# Patient Record
Sex: FEMALE | Race: WHITE | NOT HISPANIC OR LATINO | Employment: OTHER | URBAN - NONMETROPOLITAN AREA
[De-identification: names, ages, dates, MRNs, and addresses within clinical notes are randomized per-mention and may not be internally consistent; named-entity substitution may affect disease eponyms.]

---

## 2017-03-15 DIAGNOSIS — Z12.31 VISIT FOR SCREENING MAMMOGRAM: Primary | ICD-10-CM

## 2017-03-24 ENCOUNTER — ANESTHESIA EVENT (OUTPATIENT)
Dept: SURGERY | Facility: HOSPITAL | Age: 67
End: 2017-03-24
Payer: COMMERCIAL

## 2017-03-24 ENCOUNTER — ANESTHESIA (OUTPATIENT)
Dept: SURGERY | Facility: HOSPITAL | Age: 67
End: 2017-03-24
Payer: COMMERCIAL

## 2017-03-24 PROCEDURE — 45385 COLONOSCOPY W/LESION REMOVAL: CPT | Performed by: ANESTHESIOLOGY

## 2017-03-24 PROCEDURE — 25000125 ZZHC RX 250: Performed by: NURSE ANESTHETIST, CERTIFIED REGISTERED

## 2017-03-24 PROCEDURE — 25800025 ZZH RX 258: Performed by: ANESTHESIOLOGY

## 2017-03-24 PROCEDURE — 45385 COLONOSCOPY W/LESION REMOVAL: CPT | Performed by: NURSE ANESTHETIST, CERTIFIED REGISTERED

## 2017-03-24 RX ORDER — LIDOCAINE HYDROCHLORIDE 20 MG/ML
INJECTION, SOLUTION INFILTRATION; PERINEURAL PRN
Status: DISCONTINUED | OUTPATIENT
Start: 2017-03-24 | End: 2017-03-24

## 2017-03-24 RX ORDER — PROPOFOL 10 MG/ML
INJECTION, EMULSION INTRAVENOUS PRN
Status: DISCONTINUED | OUTPATIENT
Start: 2017-03-24 | End: 2017-03-24

## 2017-03-24 RX ADMIN — PROPOFOL 20 MG: 10 INJECTION, EMULSION INTRAVENOUS at 11:58

## 2017-03-24 RX ADMIN — SODIUM CHLORIDE, POTASSIUM CHLORIDE, SODIUM LACTATE AND CALCIUM CHLORIDE: 600; 310; 30; 20 INJECTION, SOLUTION INTRAVENOUS at 12:22

## 2017-03-24 RX ADMIN — PROPOFOL 20 MG: 10 INJECTION, EMULSION INTRAVENOUS at 12:08

## 2017-03-24 RX ADMIN — PROPOFOL 20 MG: 10 INJECTION, EMULSION INTRAVENOUS at 11:55

## 2017-03-24 RX ADMIN — PROPOFOL 20 MG: 10 INJECTION, EMULSION INTRAVENOUS at 11:46

## 2017-03-24 RX ADMIN — PROPOFOL 10 MG: 10 INJECTION, EMULSION INTRAVENOUS at 12:20

## 2017-03-24 RX ADMIN — PROPOFOL 20 MG: 10 INJECTION, EMULSION INTRAVENOUS at 11:44

## 2017-03-24 RX ADMIN — PROPOFOL 30 MG: 10 INJECTION, EMULSION INTRAVENOUS at 11:51

## 2017-03-24 RX ADMIN — PROPOFOL 10 MG: 10 INJECTION, EMULSION INTRAVENOUS at 12:17

## 2017-03-24 RX ADMIN — PROPOFOL 20 MG: 10 INJECTION, EMULSION INTRAVENOUS at 11:56

## 2017-03-24 RX ADMIN — PROPOFOL 20 MG: 10 INJECTION, EMULSION INTRAVENOUS at 11:43

## 2017-03-24 RX ADMIN — PROPOFOL 20 MG: 10 INJECTION, EMULSION INTRAVENOUS at 11:52

## 2017-03-24 RX ADMIN — PROPOFOL 20 MG: 10 INJECTION, EMULSION INTRAVENOUS at 11:49

## 2017-03-24 RX ADMIN — PROPOFOL 20 MG: 10 INJECTION, EMULSION INTRAVENOUS at 11:48

## 2017-03-24 RX ADMIN — PROPOFOL 20 MG: 10 INJECTION, EMULSION INTRAVENOUS at 11:45

## 2017-03-24 RX ADMIN — PROPOFOL 20 MG: 10 INJECTION, EMULSION INTRAVENOUS at 12:02

## 2017-03-24 RX ADMIN — PROPOFOL 20 MG: 10 INJECTION, EMULSION INTRAVENOUS at 11:50

## 2017-03-24 RX ADMIN — LIDOCAINE HYDROCHLORIDE 40 MG: 20 INJECTION, SOLUTION INFILTRATION; PERINEURAL at 11:43

## 2017-03-24 RX ADMIN — PROPOFOL 20 MG: 10 INJECTION, EMULSION INTRAVENOUS at 12:10

## 2017-03-24 RX ADMIN — PROPOFOL 20 MG: 10 INJECTION, EMULSION INTRAVENOUS at 12:06

## 2017-03-24 RX ADMIN — PROPOFOL 20 MG: 10 INJECTION, EMULSION INTRAVENOUS at 12:04

## 2017-03-24 RX ADMIN — PROPOFOL 20 MG: 10 INJECTION, EMULSION INTRAVENOUS at 12:12

## 2017-03-24 RX ADMIN — PROPOFOL 20 MG: 10 INJECTION, EMULSION INTRAVENOUS at 11:54

## 2017-03-24 RX ADMIN — PROPOFOL 20 MG: 10 INJECTION, EMULSION INTRAVENOUS at 11:53

## 2017-03-24 RX ADMIN — PROPOFOL 20 MG: 10 INJECTION, EMULSION INTRAVENOUS at 12:00

## 2017-03-24 RX ADMIN — PROPOFOL 30 MG: 10 INJECTION, EMULSION INTRAVENOUS at 11:47

## 2017-03-24 ASSESSMENT — LIFESTYLE VARIABLES: TOBACCO_USE: 1

## 2017-03-24 NOTE — ANESTHESIA POSTPROCEDURE EVALUATION
Patient: Alejo Fields    Procedure(s):  COLONOSCOPY WITH POLYPECTOMY, ENDOCLIP, TATOO - Wound Class: II-Clean Contaminated    Diagnosis:HX OF COLON POLYPS  Diagnosis Additional Information: No value filed.    Anesthesia Type:  MAC    Note:  Anesthesia Post Evaluation    Patient location during evaluation: Phase 2 and Bedside  Patient participation: Able to fully participate in evaluation  Level of consciousness: awake and alert  Pain management: adequate  Airway patency: patent  Cardiovascular status: acceptable  Respiratory status: acceptable  Hydration status: stable  PONV: none     Anesthetic complications: None          Last vitals:  Vitals:    03/24/17 1240 03/24/17 1245 03/24/17 1251   BP: 158/97 157/93    Pulse:      Resp:      Temp:      SpO2: 94% 94% 92%         Electronically Signed By: Rickey Vaca MD  March 24, 2017  1:38 PM

## 2017-03-24 NOTE — ANESTHESIA PREPROCEDURE EVALUATION
Anesthesia Evaluation     . Pt has had prior anesthetic.     No history of anesthetic complications          ROS/MED HX    ENT/Pulmonary:     (+)tobacco use, Past use , . .    Neurologic:  - neg neurologic ROS     Cardiovascular:  - neg cardiovascular ROS   (+) ----. : . . . :. . Previous cardiac testing date:results:date: results:ECG reviewed date:9/2/2016 results:NSR@85, OWN date: results:          METS/Exercise Tolerance:     Hematologic:  - neg hematologic  ROS       Musculoskeletal:   (+) arthritis, , , other musculoskeletal- Rheumatoid, Eczema      GI/Hepatic:     (+) bowel prep,       Renal/Genitourinary:  - ROS Renal section negative       Endo:  - neg endo ROS       Psychiatric:  - neg psychiatric ROS       Infectious Disease:  - neg infectious disease ROS       Malignancy:      - no malignancy   Other:    - neg other ROS                 Physical Exam  Normal systems: cardiovascular and pulmonary    Airway   Mallampati: IV  TM distance: <3 FB  Neck ROM: full    Dental   (+) chipped    Cardiovascular   Rhythm and rate: regular and normal      Pulmonary    breath sounds clear to auscultation                    Anesthesia Plan      History & Physical Review  History and physical reviewed and following examination; no interval change.    ASA Status:  2 .    NPO Status:  > 8 hours    Plan for MAC with Intravenous and Propofol induction. Maintenance will be TIVA.    PONV prophylaxis:  Ondansetron (or other 5HT-3)  Surgeon requests deep sedation. Patient has a Mallampati 4 airway. Will provide MAC.      Postoperative Care  Postoperative pain management:  Multi-modal analgesia.      Consents  Anesthetic plan, risks, benefits and alternatives discussed with:  Patient..                          .

## 2017-03-24 NOTE — ANESTHESIA CARE TRANSFER NOTE
Patient: Alejo Fields    Procedure(s):  COLONOSCOPY WITH POLYPECTOMY, ENDOCLIP, TATOO - Wound Class: II-Clean Contaminated    Diagnosis: HX OF COLON POLYPS  Diagnosis Additional Information: No value filed.    Anesthesia Type:   MAC     Note:  Airway :Nasal Cannula  Patient transferred to:Phase II        Vitals: (Last set prior to Anesthesia Care Transfer)    CRNA VITALS  3/24/2017 1154 - 3/24/2017 1225      3/24/2017             Resp Rate (set): 8                Electronically Signed By: VERNA Engle CRNA  March 24, 2017  12:25 PM

## 2017-09-11 ENCOUNTER — TRANSFERRED RECORDS (OUTPATIENT)
Dept: HEALTH INFORMATION MANAGEMENT | Facility: CLINIC | Age: 67
End: 2017-09-11

## 2018-05-30 ENCOUNTER — OFFICE VISIT (OUTPATIENT)
Dept: FAMILY MEDICINE | Facility: OTHER | Age: 68
End: 2018-05-30
Attending: PHYSICIAN ASSISTANT
Payer: COMMERCIAL

## 2018-05-30 VITALS
OXYGEN SATURATION: 94 % | HEIGHT: 64 IN | HEART RATE: 106 BPM | WEIGHT: 225 LBS | TEMPERATURE: 98.9 F | BODY MASS INDEX: 38.41 KG/M2 | SYSTOLIC BLOOD PRESSURE: 130 MMHG | DIASTOLIC BLOOD PRESSURE: 70 MMHG | RESPIRATION RATE: 18 BRPM

## 2018-05-30 DIAGNOSIS — R60.0 LOCALIZED EDEMA: ICD-10-CM

## 2018-05-30 DIAGNOSIS — Z83.49 FH: THYROID DISEASE: ICD-10-CM

## 2018-05-30 DIAGNOSIS — Z82.49 FH: HEART DISEASE: ICD-10-CM

## 2018-05-30 DIAGNOSIS — Z12.31 ENCOUNTER FOR SCREENING MAMMOGRAM FOR BREAST CANCER: ICD-10-CM

## 2018-05-30 DIAGNOSIS — Z78.0 MENOPAUSE: ICD-10-CM

## 2018-05-30 DIAGNOSIS — Z01.419 WELL WOMAN EXAM: Primary | ICD-10-CM

## 2018-05-30 PROCEDURE — G0463 HOSPITAL OUTPT CLINIC VISIT: HCPCS

## 2018-05-30 PROCEDURE — 99397 PER PM REEVAL EST PAT 65+ YR: CPT | Performed by: PHYSICIAN ASSISTANT

## 2018-05-30 RX ORDER — FUROSEMIDE 20 MG
TABLET ORAL
Qty: 90 TABLET | Refills: 1 | Status: SHIPPED | OUTPATIENT
Start: 2018-05-30 | End: 2022-08-24

## 2018-05-30 ASSESSMENT — ANXIETY QUESTIONNAIRES
7. FEELING AFRAID AS IF SOMETHING AWFUL MIGHT HAPPEN: NOT AT ALL
6. BECOMING EASILY ANNOYED OR IRRITABLE: NOT AT ALL
3. WORRYING TOO MUCH ABOUT DIFFERENT THINGS: NOT AT ALL
5. BEING SO RESTLESS THAT IT IS HARD TO SIT STILL: NOT AT ALL
1. FEELING NERVOUS, ANXIOUS, OR ON EDGE: NOT AT ALL
2. NOT BEING ABLE TO STOP OR CONTROL WORRYING: NOT AT ALL
IF YOU CHECKED OFF ANY PROBLEMS ON THIS QUESTIONNAIRE, HOW DIFFICULT HAVE THESE PROBLEMS MADE IT FOR YOU TO DO YOUR WORK, TAKE CARE OF THINGS AT HOME, OR GET ALONG WITH OTHER PEOPLE: NOT DIFFICULT AT ALL
GAD7 TOTAL SCORE: 0

## 2018-05-30 ASSESSMENT — PAIN SCALES - GENERAL: PAINLEVEL: NO PAIN (0)

## 2018-05-30 ASSESSMENT — PATIENT HEALTH QUESTIONNAIRE - PHQ9: 5. POOR APPETITE OR OVEREATING: NOT AT ALL

## 2018-05-30 NOTE — MR AVS SNAPSHOT
After Visit Summary   5/30/2018    Alejo Fields    MRN: [id number]           Patient Information     Date Of Birth          1950        Visit Information        Provider Department      5/30/2018 2:30 PM Ana María Sanderson PA [facility]        Today's Diagnoses     Well woman exam    -  1    FH: heart disease        FH: thyroid disease        Encounter for screening mammogram for breast cancer        Menopause        Localized edema          Care Instructions      Preventive Health Recommendations    Female Ages 65 +    Yearly exam:     See your health care provider every year in order to  o Review health changes.   o Discuss preventive care.    o Review your medicines if your doctor has prescribed any.      You no longer need a yearly Pap test unless you've had an abnormal Pap test in the past 10 years. If you have vaginal symptoms, such as bleeding or discharge, be sure to talk with your provider about a Pap test.      Every 1 to 2 years, have a mammogram.  If you are over 69, talk with your health care provider about whether or not you want to continue having screening mammograms.      Every 10 years, have a colonoscopy. Or, have a yearly FIT test (stool test). These exams will check for colon cancer.       Have a cholesterol test every 5 years, or more often if your doctor advises it.       Have a diabetes test (fasting glucose) every three years. If you are at risk for diabetes, you should have this test more often.       At age 65, have a bone density scan (DEXA) to check for osteoporosis (brittle bone disease).    Shots:    Get a flu shot each year.    Get a tetanus shot every 10 years.    Talk to your doctor about your pneumonia vaccines. There are now two you should receive - Pneumovax (PPSV 23) and Prevnar (PCV 13).    Talk to your doctor about the shingles vaccine.    Talk to your doctor about the hepatitis B vaccine.    Nutrition:     Eat at least 5 servings of  fruits and vegetables each day.      Eat whole-grain bread, whole-wheat pasta and brown rice instead of white grains and rice.      Talk to your provider about Calcium and Vitamin D.     Lifestyle    Exercise at least 150 minutes a week (30 minutes a day, 5 days a week). This will help you control your weight and prevent disease.      Limit alcohol to one drink per day.      No smoking.       Wear sunscreen to prevent skin cancer.       See your dentist twice a year for an exam and cleaning.      See your eye doctor every 1 to 2 years to screen for conditions such as glaucoma, macular degeneration and cataracts.          Follow-ups after your visit        Future tests that were ordered for you today     Open Future Orders        Priority Expected Expires Ordered    MA Screen Bilateral w/Ren Routine  5/30/2019 5/30/2018    DX Hip/Pelvis/Spine Routine  5/30/2019 5/30/2018    CBC with platelets Routine  6/30/2018 5/30/2018    Lipid Profile Routine  6/30/2018 5/30/2018    Comprehensive metabolic panel Routine  6/30/2018 5/30/2018    TSH with free T4 reflex Routine  6/30/2018 5/30/2018            Who to contact     If you have questions or need follow up information about today's clinic visit or your schedule please contact [facility] directly at [phone].  Normal or non-critical lab and imaging results will be communicated to you by Insurance Noodlehart, letter or phone within 4 business days after the clinic has received the results. If you do not hear from us within 7 days, please contact the clinic through Imnisht or phone. If you have a critical or abnormal lab result, we will notify you by phone as soon as possible.  Submit refill requests through [employer] D[employer] or call your pharmacy and they will forward the refill request to us. Please allow 3 business days for your refill to be completed.          Additional Information About Your Visit        Fresh Dish Information     Fresh D[employer] gives you secure access to your  "electronic health record. If you see a primary care provider, you can also send messages to your care team and make appointments. If you have questions, please call your primary care clinic.  If you do not have a primary care provider, please call [phone] and they will assist you.        Care EveryWhere ID     This is your Care EveryWhere ID. This could be used by other organizations to access your [facility] medical records  [id number]        Your Vitals Were     Pulse Temperature Respirations Height Pulse Oximetry BMI (Body Mass Index)    106 98.9  F (37.2  C) (Tympanic) 18 5' 3.75\" (1.619 m) 94% 38.92 kg/m2       Blood Pressure from Last 3 Encounters:   05/30/18 130/70   03/24/17 157/93   10/05/16 138/82    Weight from Last 3 Encounters:   05/30/18 225 lb (102.1 kg)   03/24/17 233 lb (105.7 kg)   10/05/16 220 lb (99.8 kg)                 Today's Medication Changes          These changes are accurate as of 5/30/18  3:38 PM.  If you have any questions, ask your nurse or doctor.               Start taking these medicines.        Dose/Directions    furosemide 20 MG tablet   Commonly known as:  LASIX   Used for:  Localized edema   Started by:  Ana María Sanderson PA        1 tab daily prn for ankle swelling   Quantity:  90 tablet   Refills:  1         Stop taking these medicines if you haven't already. Please contact your care team if you have questions.     fluocinonide 0.05 % solution   Commonly known as:  LIDEX   Stopped by:  Ana María Sanderson PA           ketoconazole 2 % shampoo   Commonly known as:  NIZORAL   Stopped by:  Ana María Sanderson PA                Where to get your medicines      These medications were sent to [facility] [address] [address]     Phone:  [phone]     furosemide 20 MG tablet                Primary Care Provider Office Phone # Fax #    JORDANA Pastor [phone]       [facility] " [place] [address]  [place] [address]        Equal Access to Services     ROSANGELATRUDI SHANNAN : Hadii maría ku hadhalio Solisbethali, waaxda luqadaha, qaybta kaalmada jamesjesisca, argelia kidd giovannikris manjarrezbharatcarlos arellano. So [facility] [phone].    ATENCIÓN: Si habla español, tiene a simons disposición servicios gratuitos de asistencia lingüística. Llame al [phone].    We comply with applicable federal civil rights laws and [place] laws. We do not discriminate on the basis of race, color, national origin, age, disability, sex, sexual orientation, or gender identity.            Thank you!     Thank you for choosing [facility]  for your care. Our goal is always to provide you with excellent care. Hearing back from our patients is one way we can continue to improve our services. Please take a few minutes to complete the written survey that you may receive in the mail after your visit with us. Thank you!             Your Updated Medication List - Protect others around you: Learn how to safely use, store and throw away your medicines at www.disposemymeds.org.          This list is accurate as of 5/30/18  3:38 PM.  Always use your most recent med list.                   Brand Name Dispense Instructions for use Diagnosis    FOLIC ACID PO      Take 1 mg by mouth 2 times daily        furosemide 20 MG tablet    LASIX    90 tablet    1 tab daily prn for ankle swelling    Localized edema       methotrexate 2.5 MG tablet CHEMO     32 tablet    8 tabs once weekly    Rheumatoid arthritis involving both hands, unspecified rheumatoid factor presence (H)       sulfaSALAzine 500 MG tablet    AZULFIDINE     Take 1,000 mg by mouth 2 times daily

## 2018-05-30 NOTE — PROGRESS NOTES
SUBJECTIVE:   Alejo Fields is a 67 year old female who presents for Preventive Visit. Concern is ankle swelling for 2 years. Worse in the summer.        Are you in the first 12 months of your Medicare Part B coverage?  No    Healthy Habits:    Do you get at least three servings of calcium containing foods daily (dairy, green leafy vegetables, etc.)? yes    Amount of exercise or daily activities, outside of work: none    Problems taking medications regularly No    Medication side effects: No    Have you had an eye exam in the past two years? yes    Do you see a dentist twice per year? no    Do you have sleep apnea, excessive snoring or daytime drowsiness?gets tired after work      Ability to successfully perform activities of daily living: Yes, no assistance needed    Home safety:  none identified     Hearing impairment: No    Fall risk:  Fallen 2 or more times in the past year?: No  Any fall with injury in the past year?: No        Mini-CogTM Copyright HAILEY Whitehead. Licensed by the author for use in [facility]; reprinted with permission ([email or web address]). All rights reserved.                Reviewed and updated as needed this visit by clinical staff  Tobacco  Allergies  Meds  Med Hx  Surg Hx  Fam Hx  Soc Hx        Reviewed and updated as needed this visit by Provider        Social History   Substance Use Topics     Smoking status: Former Smoker     Smokeless tobacco: Never Used     Alcohol use Not on file       If you drink alcohol do you typically have >3 drinks per day or >7 drinks per week?                         Today's PHQ-2 Score:   PHQ-2 ( 1999 Pfizer) 7/14/2014   Q1: Little interest or pleasure in doing things 0   Q2: Feeling down, depressed or hopeless 0   PHQ-2 Score 0       Do you feel safe in your environment - Yes    Do you have a Health Care Directive?:     Current providers sharing in care for this patient include:   Patient Care Team:  Ana María Sanderson PA as PCP - General  "(Family Practice)    The following health maintenance items are reviewed in Epic and correct as of today:  Health Maintenance   Topic Date Due     HEPATITIS C SCREENING  09/19/1969     ADVANCE DIRECTIVE PLANNING Q5 YRS  09/19/2006     FALL RISK ASSESSMENT  09/19/2016     DEXA SCAN SCREENING (SYSTEM ASSIGNED)  09/19/2016     PNEUMOCOCCAL (1 of 2 - PCV13) 09/19/2016     MAMMO SCREEN Q2 YR (SYSTEM ASSIGNED)  04/12/2018     INFLUENZA VACCINE (Season Ended) 09/01/2018     LIPID SCREEN Q5 YR FEMALE (SYSTEM ASSIGNED)  04/04/2021     TETANUS IMMUNIZATION (SYSTEM ASSIGNED)  04/04/2026     COLON CANCER SCREEN (SYSTEM ASSIGNED)  04/21/2027             ROS:  CONSTITUTIONAL: NEGATIVE for fever, chills, change in weight  INTEGUMENTARY/SKIN: NEGATIVE for worrisome rashes, moles or lesions  EYES: NEGATIVE for vision changes or irritation  ENT/MOUTH: NEGATIVE for ear, mouth and throat problems  RESP: NEGATIVE for significant cough or SOB  BREAST: NEGATIVE for masses, tenderness or discharge  CV: NEGATIVE for chest pain, palpitations or peripheral edema  GI: NEGATIVE for nausea, abdominal pain, heartburn, or change in bowel habits  : NEGATIVE for frequency, dysuria, or hematuria  MUSCULOSKELETAL: NEGATIVE for significant arthralgias or myalgia  NEURO: NEGATIVE for weakness, dizziness or paresthesias  ENDOCRINE: NEGATIVE for temperature intolerance, skin/hair changes  HEME: NEGATIVE for bleeding problems  PSYCHIATRIC: NEGATIVE for changes in mood or affect    OBJECTIVE:   /70  Pulse 106  Temp 98.9  F (37.2  C) (Tympanic)  Resp 18  Ht 5' 3.75\" (1.619 m)  Wt 225 lb (102.1 kg)  SpO2 94%  BMI 38.92 kg/m2 Estimated body mass index is 38.92 kg/(m^2) as calculated from the following:    Height as of this encounter: 5' 3.75\" (1.619 m).    Weight as of this encounter: 225 lb (102.1 kg).  EXAM:   GENERAL: healthy, alert and no distress  EYES: Eyes grossly normal to inspection, PERRL and conjunctivae and sclerae normal  HENT: ear " "canals and TM's normal, nose and mouth without ulcers or lesions  NECK: no adenopathy, no asymmetry, masses, or scars and thyroid normal to palpation  RESP: lungs clear to auscultation - no rales, rhonchi or wheezes  BREAST: normal without masses, tenderness or nipple discharge and no palpable axillary masses or adenopathy  CV: regular rate and rhythm, normal S1 S2, no S3 or S4, no murmur, click or rub, non-pitting peripheral edema and peripheral pulses strong  ABDOMEN: soft, nontender, no hepatosplenomegaly, no masses and bowel sounds normal   (female): normal female external genitalia, normal urethral meatus, vaginal mucosa pink, moist, well rugated, and normal cervix/adnexa/uterus without masses or discharge  MS: no gross musculoskeletal defects noted, no edema  SKIN: no suspicious lesions or rashes  NEURO: Normal strength and tone, mentation intact and speech normal  PSYCH: mentation appears normal, affect normal/bright    ASSESSMENT / PLAN:   (Z01.419) Well woman exam  (primary encounter diagnosis)  Comment: with below exception, normal exam today. Plan to schedule colonoscopy in the Fall. 1 month recheck of K+ after starting Lasix      (Z82.49) FH: heart disease  Comment: future lab  Plan: CBC with platelets, Lipid Profile,         Comprehensive metabolic panel            (Z83.49) FH: thyroid disease  Comment: future lab  Plan: TSH with free T4 reflex            (Z12.31) Encounter for screening mammogram for breast cancer  Plan: MA Screen Bilateral w/Ren            (Z78.0) Menopause  Plan: DX Hip/Pelvis/Spine            (R60.0) Localized edema  Comment: take prn  Plan: furosemide (LASIX) 20 MG tablet                End of Life Planning:  Patient currently has an advanced directive:     COUNSELING:          Estimated body mass index is 38.92 kg/(m^2) as calculated from the following:    Height as of this encounter: 5' 3.75\" (1.619 m).    Weight as of this encounter: 225 lb (102.1 kg).       reports that she " has quit smoking. She has never used smokeless tobacco.  Tobacco Cessation Action Plan: patient is a some day smoker    Appropriate preventive services were discussed with this patient, including applicable screening as appropriate for cardiovascular disease, diabetes, osteopenia/osteoporosis, and glaucoma.  As appropriate for age/gender, discussed screening for colorectal cancer, prostate cancer, breast cancer, and cervical cancer. Checklist reviewing preventive services available has been given to the patient.    Reviewed patients plan of care and provided an AVS. The  for Alejo meets the Care Plan requirement. This Care Plan has been established and reviewed with the .    Counseling Resources:  ATP IV Guidelines  Pooled Cohorts Equation Calculator  Breast Cancer Risk Calculator  FRAX Risk Assessment  ICSI Preventive Guidelines  Dietary Guidelines for Americans, 2010  USDA's MyPlate  ASA Prophylaxis  Lung CA Screening    JORDANA Vega  [facility]

## 2018-05-30 NOTE — NURSING NOTE
"Chief Complaint   Patient presents with     Wellness Visit     annual follow up       Initial /70  Pulse 106  Temp 98.9  F (37.2  C) (Tympanic)  Resp 18  Ht 5' 3.75\" (1.619 m)  Wt 225 lb (102.1 kg)  SpO2 94%  BMI 38.92 kg/m2 Estimated body mass index is 38.92 kg/(m^2) as calculated from the following:    Height as of this encounter: 5' 3.75\" (1.619 m).    Weight as of this encounter: 225 lb (102.1 kg).  Medication Reconciliation: complete    Laurel Zimmer LPN  "

## 2018-05-31 ASSESSMENT — ANXIETY QUESTIONNAIRES: GAD7 TOTAL SCORE: 0

## 2018-05-31 ASSESSMENT — PATIENT HEALTH QUESTIONNAIRE - PHQ9: SUM OF ALL RESPONSES TO PHQ QUESTIONS 1-9: 0

## 2018-06-08 DIAGNOSIS — Z82.49 FH: HEART DISEASE: ICD-10-CM

## 2018-06-08 DIAGNOSIS — Z83.49 FH: THYROID DISEASE: ICD-10-CM

## 2018-06-08 LAB
ALBUMIN SERPL-MCNC: 3.8 G/DL (ref 3.4–5)
ALP SERPL-CCNC: 82 U/L (ref 40–150)
ALT SERPL W P-5'-P-CCNC: 18 U/L (ref 0–50)
ANION GAP SERPL CALCULATED.3IONS-SCNC: 8 MMOL/L (ref 3–14)
AST SERPL W P-5'-P-CCNC: 13 U/L (ref 0–45)
BILIRUB SERPL-MCNC: 0.4 MG/DL (ref 0.2–1.3)
BUN SERPL-MCNC: 13 MG/DL (ref 7–30)
CALCIUM SERPL-MCNC: 8.8 MG/DL (ref 8.5–10.1)
CHLORIDE SERPL-SCNC: 107 MMOL/L (ref 94–109)
CHOLEST SERPL-MCNC: 202 MG/DL
CO2 SERPL-SCNC: 27 MMOL/L (ref 20–32)
CREAT SERPL-MCNC: 0.55 MG/DL (ref 0.52–1.04)
ERYTHROCYTE [DISTWIDTH] IN BLOOD BY AUTOMATED COUNT: 12.9 % (ref 10–15)
GFR SERPL CREATININE-BSD FRML MDRD: >90 ML/MIN/1.7M2
GLUCOSE SERPL-MCNC: 98 MG/DL (ref 70–99)
HCT VFR BLD AUTO: 38.4 % (ref 35–47)
HDLC SERPL-MCNC: 53 MG/DL
HGB BLD-MCNC: 13.2 G/DL (ref 11.7–15.7)
LDLC SERPL CALC-MCNC: 130 MG/DL
MCH RBC QN AUTO: 32 PG (ref 26.5–33)
MCHC RBC AUTO-ENTMCNC: 34.4 G/DL (ref 31.5–36.5)
MCV RBC AUTO: 93 FL (ref 78–100)
NONHDLC SERPL-MCNC: 149 MG/DL
PLATELET # BLD AUTO: 333 10E9/L (ref 150–450)
POTASSIUM SERPL-SCNC: 4.1 MMOL/L (ref 3.4–5.3)
PROT SERPL-MCNC: 7.7 G/DL (ref 6.8–8.8)
RBC # BLD AUTO: 4.12 10E12/L (ref 3.8–5.2)
SODIUM SERPL-SCNC: 142 MMOL/L (ref 133–144)
TRIGL SERPL-MCNC: 95 MG/DL
TSH SERPL DL<=0.005 MIU/L-ACNC: 2.49 MU/L (ref 0.4–4)
WBC # BLD AUTO: 8 10E9/L (ref 4–11)

## 2018-06-08 PROCEDURE — 80053 COMPREHEN METABOLIC PANEL: CPT | Mod: ZL | Performed by: PHYSICIAN ASSISTANT

## 2018-06-08 PROCEDURE — 84443 ASSAY THYROID STIM HORMONE: CPT | Mod: ZL | Performed by: PHYSICIAN ASSISTANT

## 2018-06-08 PROCEDURE — 80061 LIPID PANEL: CPT | Mod: ZL | Performed by: PHYSICIAN ASSISTANT

## 2018-06-08 PROCEDURE — 85027 COMPLETE CBC AUTOMATED: CPT | Mod: ZL | Performed by: PHYSICIAN ASSISTANT

## 2018-06-08 PROCEDURE — 36415 COLL VENOUS BLD VENIPUNCTURE: CPT | Mod: ZL | Performed by: PHYSICIAN ASSISTANT

## 2018-06-11 ENCOUNTER — HOSPITAL ENCOUNTER (OUTPATIENT)
Dept: BONE DENSITY | Facility: HOSPITAL | Age: 68
Discharge: HOME | End: 2018-06-11
Attending: PHYSICIAN ASSISTANT | Admitting: PHYSICIAN ASSISTANT
Payer: COMMERCIAL

## 2018-06-11 ENCOUNTER — RADIANT APPOINTMENT (OUTPATIENT)
Dept: MAMMOGRAPHY | Facility: OTHER | Age: 68
End: 2018-06-11
Attending: PHYSICIAN ASSISTANT
Payer: COMMERCIAL

## 2018-06-11 DIAGNOSIS — Z78.0 MENOPAUSE: ICD-10-CM

## 2018-06-11 DIAGNOSIS — Z12.31 ENCOUNTER FOR SCREENING MAMMOGRAM FOR BREAST CANCER: ICD-10-CM

## 2018-06-11 PROCEDURE — 77080 DXA BONE DENSITY AXIAL: CPT | Mod: TC

## 2018-06-11 PROCEDURE — 77067 SCR MAMMO BI INCL CAD: CPT | Mod: TC

## 2019-11-12 ENCOUNTER — ANCILLARY PROCEDURE (OUTPATIENT)
Dept: MAMMOGRAPHY | Facility: OTHER | Age: 69
End: 2019-11-12
Attending: PHYSICIAN ASSISTANT
Payer: COMMERCIAL

## 2019-11-12 DIAGNOSIS — Z12.31 VISIT FOR SCREENING MAMMOGRAM: ICD-10-CM

## 2019-11-12 PROCEDURE — 77063 BREAST TOMOSYNTHESIS BI: CPT | Mod: TC

## 2020-03-02 ENCOUNTER — HEALTH MAINTENANCE LETTER (OUTPATIENT)
Age: 70
End: 2020-03-02

## 2020-12-20 ENCOUNTER — HEALTH MAINTENANCE LETTER (OUTPATIENT)
Age: 70
End: 2020-12-20

## 2021-04-18 ENCOUNTER — HEALTH MAINTENANCE LETTER (OUTPATIENT)
Age: 71
End: 2021-04-18

## 2021-10-03 ENCOUNTER — HEALTH MAINTENANCE LETTER (OUTPATIENT)
Age: 71
End: 2021-10-03

## 2021-12-14 ENCOUNTER — OFFICE VISIT (OUTPATIENT)
Dept: FAMILY MEDICINE | Facility: OTHER | Age: 71
End: 2021-12-14
Attending: PHYSICIAN ASSISTANT
Payer: COMMERCIAL

## 2021-12-14 VITALS
TEMPERATURE: 98.8 F | HEIGHT: 63 IN | OXYGEN SATURATION: 91 % | BODY MASS INDEX: 46.56 KG/M2 | RESPIRATION RATE: 18 BRPM | SYSTOLIC BLOOD PRESSURE: 138 MMHG | WEIGHT: 262.8 LBS | DIASTOLIC BLOOD PRESSURE: 66 MMHG | HEART RATE: 96 BPM

## 2021-12-14 DIAGNOSIS — Z00.00 ROUTINE GENERAL MEDICAL EXAMINATION AT A HEALTH CARE FACILITY: Primary | ICD-10-CM

## 2021-12-14 DIAGNOSIS — Z12.31 ENCOUNTER FOR SCREENING MAMMOGRAM FOR BREAST CANCER: ICD-10-CM

## 2021-12-14 DIAGNOSIS — M05.732 RHEUMATOID ARTHRITIS INVOLVING BOTH WRISTS WITH POSITIVE RHEUMATOID FACTOR (H): ICD-10-CM

## 2021-12-14 DIAGNOSIS — Z83.3 FAMILY HISTORY OF DIABETES MELLITUS: ICD-10-CM

## 2021-12-14 DIAGNOSIS — M05.731 RHEUMATOID ARTHRITIS INVOLVING BOTH WRISTS WITH POSITIVE RHEUMATOID FACTOR (H): ICD-10-CM

## 2021-12-14 DIAGNOSIS — R60.9 EDEMA, UNSPECIFIED TYPE: ICD-10-CM

## 2021-12-14 PROBLEM — M06.9 RA (RHEUMATOID ARTHRITIS) (H): Status: ACTIVE | Noted: 2021-12-14

## 2021-12-14 LAB
ERYTHROCYTE [DISTWIDTH] IN BLOOD BY AUTOMATED COUNT: 13.9 % (ref 10–15)
HCT VFR BLD AUTO: 40.1 % (ref 35–47)
HGB BLD-MCNC: 13 G/DL (ref 11.7–15.7)
MCH RBC QN AUTO: 30.8 PG (ref 26.5–33)
MCHC RBC AUTO-ENTMCNC: 32.4 G/DL (ref 31.5–36.5)
MCV RBC AUTO: 95 FL (ref 78–100)
PLATELET # BLD AUTO: 351 10E3/UL (ref 150–450)
RBC # BLD AUTO: 4.22 10E6/UL (ref 3.8–5.2)
WBC # BLD AUTO: 9.4 10E3/UL (ref 4–11)

## 2021-12-14 PROCEDURE — 85027 COMPLETE CBC AUTOMATED: CPT | Mod: ZL | Performed by: PHYSICIAN ASSISTANT

## 2021-12-14 PROCEDURE — 84443 ASSAY THYROID STIM HORMONE: CPT | Mod: ZL | Performed by: PHYSICIAN ASSISTANT

## 2021-12-14 PROCEDURE — 80061 LIPID PANEL: CPT | Mod: ZL | Performed by: PHYSICIAN ASSISTANT

## 2021-12-14 PROCEDURE — 36415 COLL VENOUS BLD VENIPUNCTURE: CPT | Mod: ZL | Performed by: PHYSICIAN ASSISTANT

## 2021-12-14 PROCEDURE — 99397 PER PM REEVAL EST PAT 65+ YR: CPT | Performed by: PHYSICIAN ASSISTANT

## 2021-12-14 PROCEDURE — 80053 COMPREHEN METABOLIC PANEL: CPT | Mod: ZL | Performed by: PHYSICIAN ASSISTANT

## 2021-12-14 ASSESSMENT — PAIN SCALES - GENERAL: PAINLEVEL: NO PAIN (0)

## 2021-12-14 ASSESSMENT — ACTIVITIES OF DAILY LIVING (ADL): CURRENT_FUNCTION: NO ASSISTANCE NEEDED

## 2021-12-14 ASSESSMENT — MIFFLIN-ST. JEOR: SCORE: 1681.05

## 2021-12-14 NOTE — LETTER
December 15, 2021      Alejo Fields  [address]  [place] [address]        Dear ,    We are writing to inform you of your test results.    Your test results fall within the expected range(s) or remain unchanged from previous results.  Please continue with current treatment plan.    Resulted Orders   CBC with platelets   Result Value Ref Range    WBC Count 9.4 4.0 - 11.0 10e3/uL    RBC Count 4.22 3.80 - 5.20 10e6/uL    Hemoglobin 13.0 11.7 - 15.7 g/dL    Hematocrit 40.1 35.0 - 47.0 %    MCV 95 78 - 100 fL    MCH 30.8 26.5 - 33.0 pg    MCHC 32.4 31.5 - 36.5 g/dL    RDW 13.9 10.0 - 15.0 %    Platelet Count 351 150 - 450 10e3/uL   Comprehensive metabolic panel (BMP + Alb, Alk Phos, ALT, AST, Total. Bili, TP)   Result Value Ref Range    Sodium 140 133 - 144 mmol/L    Potassium 4.1 3.4 - 5.3 mmol/L    Chloride 107 94 - 109 mmol/L    Carbon Dioxide (CO2) 26 20 - 32 mmol/L    Anion Gap 7 3 - 14 mmol/L    Urea Nitrogen 19 7 - 30 mg/dL    Creatinine 0.73 0.52 - 1.04 mg/dL    Calcium 9.2 8.5 - 10.1 mg/dL    Glucose 99 70 - 99 mg/dL    Alkaline Phosphatase 85 40 - 150 U/L    AST 12 0 - 45 U/L    ALT 22 0 - 50 U/L    Protein Total 7.8 6.8 - 8.8 g/dL    Albumin 3.7 3.4 - 5.0 g/dL    Bilirubin Total 0.3 0.2 - 1.3 mg/dL    GFR Estimate 84 >60 mL/min/1.73m2      Comment:      As of July 11, 2021, eGFR is calculated by the CKD-EPI creatinine equation, without race adjustment. eGFR can be influenced by muscle mass, exercise, and diet. The reported eGFR is an estimation only and is only applicable if the renal function is stable.   Lipid Profile (Chol, Trig, HDL, LDL calc)   Result Value Ref Range    Cholesterol 205 (H) <200 mg/dL    Triglycerides 96 <150 mg/dL    Direct Measure HDL 51 >=50 mg/dL    LDL Cholesterol Calculated 135 (H) <=100 mg/dL    Non HDL Cholesterol 154 (H) <130 mg/dL    Patient Fasting > 8hrs? No     Narrative    Cholesterol  Desirable:  <200 mg/dL    Triglycerides  Normal:  Less than 150  mg/dL  Borderline High:  150-199 mg/dL  High:  200-499 mg/dL  Very High:  Greater than or equal to 500 mg/dL    Direct Measure HDL  Female:  Greater than or equal to 50 mg/dL   Male:  Greater than or equal to 40 mg/dL    LDL Cholesterol  Desirable:  <100mg/dL  Above Desirable:  100-129 mg/dL   Borderline High:  130-159 mg/dL   High:  160-189 mg/dL   Very High:  >= 190 mg/dL    Non HDL Cholesterol  Desirable:  130 mg/dL  Above Desirable:  130-159 mg/dL  Borderline High:  160-189 mg/dL  High:  190-219 mg/dL  Very High:  Greater than or equal to 220 mg/dL   TSH with free T4 reflex   Result Value Ref Range    TSH 1.72 0.40 - 4.00 mU/L       If you have any questions or concerns, please call the clinic at the number listed above.       Sincerely,      Ana María Sanderson PA

## 2021-12-14 NOTE — NURSING NOTE
"Chief Complaint   Patient presents with     Physical       Initial BP (!) 154/100   Pulse 96   Temp 98.8  F (37.1  C)   Resp 18   Ht 1.6 m (5' 2.99\")   Wt 119.2 kg (262 lb 12.8 oz)   SpO2 91%   BMI 46.56 kg/m   Estimated body mass index is 46.56 kg/m  as calculated from the following:    Height as of this encounter: 1.6 m (5' 2.99\").    Weight as of this encounter: 119.2 kg (262 lb 12.8 oz).  Medication Reconciliation: anika Rivero  "

## 2021-12-14 NOTE — PATIENT INSTRUCTIONS
Preventive Health Recommendations    See your health care provider every year to    Review health changes.     Discuss preventive care.      Review your medicines if your doctor has prescribed any.      You no longer need a yearly Pap test unless you've had an abnormal Pap test in the past 10 years. If you have vaginal symptoms, such as bleeding or discharge, be sure to talk with your provider about a Pap test.      Every 1 to 2 years, have a mammogram.  If you are over 69, talk with your health care provider about whether or not you want to continue having screening mammograms.      Every 10 years, have a colonoscopy. Or, have a yearly FIT test (stool test). These exams will check for colon cancer.       Have a cholesterol test every 5 years, or more often if your doctor advises it.       Have a diabetes test (fasting glucose) every three years. If you are at risk for diabetes, you should have this test more often.       At age 65, have a bone density scan (DEXA) to check for osteoporosis (brittle bone disease).    Shots:    Get a flu shot each year.    Get a tetanus shot every 10 years.    Talk to your doctor about your pneumonia vaccines. There are now two you should receive - Pneumovax (PPSV 23) and Prevnar (PCV 13).    Talk to your pharmacist about the shingles vaccine.    Talk to your doctor about the hepatitis B vaccine.    Nutrition:     Eat at least 5 servings of fruits and vegetables each day.      Eat whole-grain bread, whole-wheat pasta and brown rice instead of white grains and rice.      Get adequate about Calcium and Vitamin D.     Lifestyle    Exercise at least 150 minutes a week (30 minutes a day, 5 days a week). This will help you control your weight and prevent disease.      Limit alcohol to one drink per day.      No smoking.       Wear sunscreen to prevent skin cancer.       See your dentist twice a year for an exam and cleaning.      See your eye doctor every 1 to 2 years to screen for  conditions such as glaucoma, macular degeneration, cataracts, etc.    Personalized Prevention Plan  You are due for the preventive services outlined below.  Your care team is available to assist you in scheduling these services.  If you have already completed any of these items, please share that information with your care team to update in your medical record.    Health Maintenance Due   Topic Date Due     Discuss Advance Care Planning  Never done     COVID-19 Vaccine (1) Never done     Hepatitis C Screening  Never done     Zoster (Shingles) Vaccine (1 of 2) Never done     LUNG CANCER SCREENING  Never done     Pneumococcal Vaccine (1 of 1 - PPSV23) Never done     Annual Wellness Visit  05/30/2019     FALL RISK ASSESSMENT  05/30/2019     PHQ-2  01/01/2021     Flu Vaccine (1) Never done     Mammogram  11/12/2021

## 2021-12-14 NOTE — PROGRESS NOTES
"   SUBJECTIVE:   CC: Alejo Fields is an 71 year old woman who presents for preventive health visit.       Patient has been advised of split billing requirements and indicates understanding: Yes  Healthy Habits:    In general, how would you rate your overall health?  Fair    Frequency of exercise:  None    Duration of exercise:  Less than 15 minutes    Do you usually eat at least 4 servings of fruit and vegetables a day, include whole grains    & fiber and avoid regularly eating high fat or \"junk\" foods?  Yes    Taking medications regularly:  No    Barriers to taking medications:  None    Medication side effects:  None    Ability to successfully perform activities of daily living:  No assistance needed    Home Safety:  No safety concerns identified    Hearing Impairment:  No hearing concerns    In the past 6 months, have you been bothered by leaking of urine?  No    In general, how would you rate your overall mental or emotional health?  Good      PHQ-2 Total Score:    Additional concerns today:  No    Ability to successfully perform activities of daily living: Yes, no assistance needed  Home safety:  none identified   Hearing impairment: No      Cognitive Screening   1) Repeat 3 items (Leader, Season, Table)    2) Clock draw: NORMAL  3) 3 item recall: Recalls 2 objects   Results: NORMAL clock, 1-2 items recalled: COGNITIVE IMPAIRMENT LESS LIKELY        Today's PHQ-2 Score:   PHQ-2 ( 1999 Pfizer) 7/14/2014   Q1: Little interest or pleasure in doing things 0   Q2: Feeling down, depressed or hopeless 0   PHQ-2 Score 0       Abuse: Current or Past (Physical, Sexual or Emotional) - No  Do you feel safe in your environment? Yes    Have you ever done Advance Care Planning? (For example, a Health Directive, POLST, or a discussion with a medical provider or your loved ones about your wishes): Yes, advance care planning is on file.    Social History     Tobacco Use     Smoking status: Current Some Day Smoker     Smokeless " "tobacco: Never Used   Substance Use Topics     Alcohol use: Not on file     If you drink alcohol do you typically have >3 drinks per day or >7 drinks per week? No    Alcohol Use 12/14/2021   Prescreen: >3 drinks/day or >7 drinks/week? No       Reviewed orders with patient.  Reviewed health maintenance and updated orders accordingly - Yes      Breast Cancer Screening:        History of abnormal Pap smear:   PAP / HPV Latest Ref Rng & Units 4/4/2016   PAP (Historical) - NIL   HPV16 NEG Negative   HPV18 NEG Negative   HRHPV NEG Negative     Reviewed and updated as needed this visit by clinical staff  Tobacco  Allergies  Meds             Reviewed and updated as needed this visit by Provider                   Review of Systems  CONSTITUTIONAL: NEGATIVE for fever, chills, change in weight  INTEGUMENTARY/SKIN: NEGATIVE for worrisome rashes, moles or lesions  EYES: NEGATIVE for vision changes or irritation  ENT: NEGATIVE for ear, mouth and throat problems  RESP: NEGATIVE for significant cough or SOB  BREAST: NEGATIVE for masses, tenderness or discharge  CV: NEGATIVE for chest pain, palpitations or peripheral edema  GI: NEGATIVE for nausea, abdominal pain, heartburn, or change in bowel habits  : NEGATIVE for unusual urinary or vaginal symptoms. No vaginal bleeding.  MUSCULOSKELETAL: NEGATIVE for significant arthralgias or myalgia  NEURO: NEGATIVE for weakness, dizziness or paresthesias  PSYCHIATRIC: NEGATIVE for changes in mood or affect      OBJECTIVE:   BP (!) 154/100   Pulse 96   Temp 98.8  F (37.1  C)   Resp 18   Ht 1.6 m (5' 2.99\")   Wt 119.2 kg (262 lb 12.8 oz)   SpO2 91%   BMI 46.56 kg/m    Physical Exam  GENERAL: healthy, alert and no distress  EYES: Eyes grossly normal to inspection, PERRL and conjunctivae and sclerae normal  HENT: ear canals and TM's normal, nose and mouth without ulcers or lesions  NECK: no adenopathy, no asymmetry, masses, or scars and thyroid normal to palpation  RESP: lungs clear to " "auscultation - no rales, rhonchi or wheezes  BREAST: normal without masses, tenderness or nipple discharge and no palpable axillary masses or adenopathy  CV: regular rate and rhythm, normal S1 S2, no S3 or S4, no murmur, click or rub, no peripheral edema and peripheral pulses strong  ABDOMEN: soft, nontender, no hepatosplenomegaly, no masses and bowel sounds normal  MS: no gross musculoskeletal defects noted, no edema  SKIN: no suspicious lesions or rashes  NEURO: Normal strength and tone, mentation intact and speech normal  PSYCH: mentation appears normal, affect normal/bright        ASSESSMENT/PLAN:   (Z00.00) Routine general medical examination at a health care facility  (primary encounter diagnosis)  Comment: Normal exam today    (Z12.31) Encounter for screening mammogram for breast cance  Plan: MA Screen Bilateral w/Ren            (R60.9) Edema, unspecified type  Comment: refer to lymphedema clinic for consult.  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 Bili, TP), Lipid Profile        (Chol, Trig, HDL, LDL calc), TSH with free T4         reflex                  COUNSELING:  Reviewed preventive health counseling, as reflected in patient instructions       Regular exercise       Healthy diet/nutrition    Estimated body mass index is 46.56 kg/m  as calculated from the following:    Height as of this encounter: 1.6 m (5' 2.99\").    Weight as of this encounter: 119.2 kg (262 lb 12.8 oz).        She reports that she has been smoking. She has never used smokeless tobacco.  Tobacco Cessation Action Plan:         Counseling Resources:  ATP IV Guidelines  Pooled Cohorts Equation Calculator  Breast Cancer Risk Calculator  BRCA-Related Cancer Risk Assessment: FHS-7 " Tool  FRAX Risk Assessment  ICSI Preventive Guidelines  Dietary Guidelines for Americans, 2010  USDA's MyPlate  ASA Prophylaxis  Lung CA Screening    JORDANA Vega  [facility]

## 2021-12-15 LAB
ALBUMIN SERPL-MCNC: 3.7 G/DL (ref 3.4–5)
ALP SERPL-CCNC: 85 U/L (ref 40–150)
ALT SERPL W P-5'-P-CCNC: 22 U/L (ref 0–50)
ANION GAP SERPL CALCULATED.3IONS-SCNC: 7 MMOL/L (ref 3–14)
AST SERPL W P-5'-P-CCNC: 12 U/L (ref 0–45)
BILIRUB SERPL-MCNC: 0.3 MG/DL (ref 0.2–1.3)
BUN SERPL-MCNC: 19 MG/DL (ref 7–30)
CALCIUM SERPL-MCNC: 9.2 MG/DL (ref 8.5–10.1)
CHLORIDE BLD-SCNC: 107 MMOL/L (ref 94–109)
CHOLEST SERPL-MCNC: 205 MG/DL
CO2 SERPL-SCNC: 26 MMOL/L (ref 20–32)
CREAT SERPL-MCNC: 0.73 MG/DL (ref 0.52–1.04)
FASTING STATUS PATIENT QL REPORTED: NO
GFR SERPL CREATININE-BSD FRML MDRD: 84 ML/MIN/1.73M2
GLUCOSE BLD-MCNC: 99 MG/DL (ref 70–99)
HDLC SERPL-MCNC: 51 MG/DL
LDLC SERPL CALC-MCNC: 135 MG/DL
NONHDLC SERPL-MCNC: 154 MG/DL
POTASSIUM BLD-SCNC: 4.1 MMOL/L (ref 3.4–5.3)
PROT SERPL-MCNC: 7.8 G/DL (ref 6.8–8.8)
SODIUM SERPL-SCNC: 140 MMOL/L (ref 133–144)
TRIGL SERPL-MCNC: 96 MG/DL
TSH SERPL DL<=0.005 MIU/L-ACNC: 1.72 MU/L (ref 0.4–4)

## 2021-12-30 ENCOUNTER — HOSPITAL ENCOUNTER (OUTPATIENT)
Dept: OCCUPATIONAL THERAPY | Facility: HOSPITAL | Age: 71
Setting detail: THERAPIES SERIES
End: 2021-12-30
Attending: PHYSICIAN ASSISTANT
Payer: COMMERCIAL

## 2021-12-30 DIAGNOSIS — R60.9 EDEMA, UNSPECIFIED TYPE: ICD-10-CM

## 2021-12-30 PROCEDURE — 97535 SELF CARE MNGMENT TRAINING: CPT | Mod: GO

## 2021-12-30 PROCEDURE — 97166 OT EVAL MOD COMPLEX 45 MIN: CPT | Mod: GO

## 2021-12-30 NOTE — PROGRESS NOTES
12/30/21 1400   Quick Adds   Quick Adds Certification   Rehab Discipline   Discipline OT   Type of Visit   Type of visit Initial Edema Evaluation   General Information   Start of care 12/30/21   Referring physician Ana María SILVEIRA   Orders Per therapist evaluation;Evaluate and treat as indicated   Order date 12/14/21   Medical diagnosis lymphedema   Affected body parts RLE   Edema etiology Infection   Edema etiology comments Pt reported she had a wound on her Lt leg about 8 yrs ago that started her edema   Pertinent history of current problem (PT: include personal factors and/or comorbidities that impact the POC; OT: include additional occupational profile info) Pt lives alone, she works in a job where she is standing all day. Pt is a smoker and is overweight   Surgical / medical history reviewed Yes   Prior treatment   (pt reports she can't get SHELLY hose on)   Patient role / employment history Employed  (works part-time at [employer] 14 hrs/wk)   Psychosocial concerns Depression;Impaired body image   Living environment House / [facility]   Living environment comments Lives alone in a multi-level house   General observations Pt presented to evaluation alone today   Fall Risk Screen   Fall screen completed by OT   Have you fallen 2 or more times in the past year? No   Have you fallen and had an injury in the past year? No   Is patient a fall risk? No   Abuse Screen (yes response referral indicated)   Feels Unsafe at Home or Work/School no   Feels Threatened by Someone no   Does Anyone Try to Keep You From Having Contact with Others or Doing Things Outside Your Home? no   Physical Signs of Abuse Present no   System Outcome Measures   Outcome Measures Lymphedema   Lymphedema Life Impact Scale (score range 0-72). A higher score indicates greater impairment. 21   Subjective Report   Patient report of symptoms legs feel heavy, tight skin   Pain   Patient currently in pain No   Vitals Signs   Heart Rate 103   SpO2 95    Weight 262   Cognitive Status   Orientation Orientation to person, place and time   Level of consciousness Alert   Personal safety and judgement Intact   Memory Intact   Edema Exam / Assessment   Skin condition Pitting;Intact   Pitting 2+   Pitting location anterior of B LEs   Scar No   Capillary refill Symmetrical   Dorsal pedal pulse Symmetrical;Decreased   Stemmer sign Negative   Girth Measurements   Girth Measurements Refer to separate girth measurement flowsheet   Volume LE   Right LE (mL) 55106.55   Left LE (mL) 78185.59   LE volume comparison LLE volume greater than RLE volume   % difference 7.2   Range of Motion   ROM No deficits were identified   Strength   Right hand  (pounds) 45   Left hand  (pounds) 47   Posture   Posture Normal   Coordination   Coordination Gross motor coordination appropriate   Muscle Tone   Muscle tone No deficits were identified   Planned Edema Interventions   Planned edema interventions Manual lymph drainage;Gradient compression bandaging;Exercises;Precautions to prevent infection / exacerbation;Education;Skin care / precautions;Home management program development   Clinical Impression   Criteria for skilled therapeutic intervention met Yes   Therapy diagnosis edema   Influenced by the following impairments / conditions Edema;Stage 2   Assessment of Occupational Performance 1-3 Performance Deficits   Identified Performance Deficits difficulty standing/walking, pain in legs/feet   Clinical Decision Making (Complexity) Moderate complexity   Treatment Frequency 4x/week   Treatment duration 1 month   Patient / family and/or staff in agreement with plan of care Yes   Risks and benefits of therapy have been explained Yes   Clinical impression comments Pt's edema began 8 yrs ago per her report. Her Lt leg does have greater volume compared to Rt leg and she has 2+ pitting edema. Pt would benefit from complete decongestive therapy which includes manual lymph drainage and  compression bandages in addition to patient education on home program of self massage and compression garments to maintain after treatment. Pt will start with exercises and follow up next week.   Education Assessment   Preferred learning style Listening;Reading;Demonstration   Barriers to learning No barriers   Goals   Edema Eval Goals 1;2;3   Goal 1   Goal identifier LTG 1   Goal description Pt will have a decrease in Lt limb volume comparable to Rt   Target date 01/28/22   Goal 2   Goal identifier STG 1   Goal description Pt will be able to follow HEP consistently for 2 weeks   Target date 01/14/22   Goal 3   Goal identifier STG 2   Goal description Pt will purchase compression banadages and set up follow ups for MLD   Target date 01/07/22   Total Evaluation Time   OT Eval, Moderate Complexity Minutes (52816) 60   Certification   Certification date from 12/30/21   Certification date to 02/28/22   Medical Diagnosis lymphedema   Certification I certify the need for these services furnished under this plan of treatment and while under my care.  (Physician co-signature of this document indicates review and certification of the therapy plan).

## 2022-01-06 ENCOUNTER — HOSPITAL ENCOUNTER (OUTPATIENT)
Dept: OCCUPATIONAL THERAPY | Facility: HOSPITAL | Age: 72
Setting detail: THERAPIES SERIES
End: 2022-01-06
Attending: PHYSICIAN ASSISTANT
Payer: COMMERCIAL

## 2022-01-06 PROCEDURE — 97110 THERAPEUTIC EXERCISES: CPT | Mod: GO

## 2022-01-22 ENCOUNTER — HEALTH MAINTENANCE LETTER (OUTPATIENT)
Age: 72
End: 2022-01-22

## 2022-01-31 ENCOUNTER — ANCILLARY PROCEDURE (OUTPATIENT)
Dept: MAMMOGRAPHY | Facility: OTHER | Age: 72
End: 2022-01-31
Attending: PHYSICIAN ASSISTANT
Payer: COMMERCIAL

## 2022-01-31 ENCOUNTER — TELEPHONE (OUTPATIENT)
Dept: MAMMOGRAPHY | Facility: OTHER | Age: 72
End: 2022-01-31
Payer: COMMERCIAL

## 2022-01-31 DIAGNOSIS — Z12.31 ENCOUNTER FOR SCREENING MAMMOGRAM FOR BREAST CANCER: ICD-10-CM

## 2022-01-31 PROCEDURE — 77067 SCR MAMMO BI INCL CAD: CPT | Mod: TC

## 2022-02-01 ENCOUNTER — HOSPITAL ENCOUNTER (OUTPATIENT)
Dept: OCCUPATIONAL THERAPY | Facility: HOSPITAL | Age: 72
Setting detail: THERAPIES SERIES
End: 2022-02-01
Attending: PHYSICIAN ASSISTANT
Payer: COMMERCIAL

## 2022-02-01 PROCEDURE — 97140 MANUAL THERAPY 1/> REGIONS: CPT | Mod: GO

## 2022-02-02 ENCOUNTER — HOSPITAL ENCOUNTER (OUTPATIENT)
Dept: OCCUPATIONAL THERAPY | Facility: HOSPITAL | Age: 72
Setting detail: THERAPIES SERIES
End: 2022-02-02
Attending: PHYSICIAN ASSISTANT
Payer: COMMERCIAL

## 2022-02-02 PROCEDURE — 97140 MANUAL THERAPY 1/> REGIONS: CPT | Mod: GO

## 2022-02-03 ENCOUNTER — HOSPITAL ENCOUNTER (OUTPATIENT)
Dept: OCCUPATIONAL THERAPY | Facility: HOSPITAL | Age: 72
Setting detail: THERAPIES SERIES
End: 2022-02-03
Attending: PHYSICIAN ASSISTANT
Payer: COMMERCIAL

## 2022-02-03 PROCEDURE — 97140 MANUAL THERAPY 1/> REGIONS: CPT | Mod: GO

## 2022-02-08 ENCOUNTER — HOSPITAL ENCOUNTER (OUTPATIENT)
Dept: OCCUPATIONAL THERAPY | Facility: HOSPITAL | Age: 72
Setting detail: THERAPIES SERIES
End: 2022-02-08
Attending: PHYSICIAN ASSISTANT
Payer: COMMERCIAL

## 2022-02-08 PROCEDURE — 97140 MANUAL THERAPY 1/> REGIONS: CPT | Mod: GO

## 2022-02-09 ENCOUNTER — HOSPITAL ENCOUNTER (OUTPATIENT)
Dept: OCCUPATIONAL THERAPY | Facility: HOSPITAL | Age: 72
Setting detail: THERAPIES SERIES
End: 2022-02-09
Attending: PHYSICIAN ASSISTANT
Payer: COMMERCIAL

## 2022-02-09 PROCEDURE — 97140 MANUAL THERAPY 1/> REGIONS: CPT | Mod: GO

## 2022-02-10 ENCOUNTER — HOSPITAL ENCOUNTER (OUTPATIENT)
Dept: OCCUPATIONAL THERAPY | Facility: HOSPITAL | Age: 72
Setting detail: THERAPIES SERIES
End: 2022-02-10
Attending: PHYSICIAN ASSISTANT
Payer: COMMERCIAL

## 2022-02-10 PROCEDURE — 97140 MANUAL THERAPY 1/> REGIONS: CPT | Mod: GO

## 2022-02-15 ENCOUNTER — HOSPITAL ENCOUNTER (OUTPATIENT)
Dept: OCCUPATIONAL THERAPY | Facility: HOSPITAL | Age: 72
Setting detail: THERAPIES SERIES
End: 2022-02-15
Attending: PHYSICIAN ASSISTANT
Payer: COMMERCIAL

## 2022-02-15 PROCEDURE — 97140 MANUAL THERAPY 1/> REGIONS: CPT | Mod: GO

## 2022-02-16 ENCOUNTER — HOSPITAL ENCOUNTER (OUTPATIENT)
Dept: OCCUPATIONAL THERAPY | Facility: HOSPITAL | Age: 72
Setting detail: THERAPIES SERIES
End: 2022-02-16
Attending: PHYSICIAN ASSISTANT
Payer: COMMERCIAL

## 2022-02-16 PROCEDURE — 97140 MANUAL THERAPY 1/> REGIONS: CPT | Mod: GO

## 2022-02-16 NOTE — PROGRESS NOTES
02/16/22 1500   Signing Clinician's Name / Credentials   Signing clinician's name / credentials Laura Darby OTR/L,CLT   Discipline   Discipline OT   Lower Extremity Girth Measurements   LT: Great Toe 9.2 cms   LT: MTP's 23.7 cms   LT: Arch of Foot 26 cms   LT: Calcaneous 33.5   LT:  Malleolus 28.5 cms   LT: 10 cm above heel 26.9 cms   LT: 20 cm above heel 38.5 cms   LT: 30 cm above heel 47 cms   LT: 40 cm above heel 47.9 cms   LT: 50 cm above heel 52.5 cms   LT: 60 cm above heel 63 cms   LT: 70 cm above heel 67 cms   LT: Lower Extremity Total Volume 16055.14

## 2022-07-11 ENCOUNTER — ALLIED HEALTH/NURSE VISIT (OUTPATIENT)
Dept: FAMILY MEDICINE | Facility: OTHER | Age: 72
End: 2022-07-11
Attending: NURSE PRACTITIONER
Payer: COMMERCIAL

## 2022-07-11 VITALS — SYSTOLIC BLOOD PRESSURE: 132 MMHG | DIASTOLIC BLOOD PRESSURE: 84 MMHG

## 2022-07-11 DIAGNOSIS — Z01.30 BP CHECK: ICD-10-CM

## 2022-07-11 DIAGNOSIS — Z23 NEED FOR VACCINATION: Primary | ICD-10-CM

## 2022-07-11 PROCEDURE — 90677 PCV20 VACCINE IM: CPT

## 2022-08-22 NOTE — PROGRESS NOTES
"  {PROVIDER CHARTING PREFERENCE:090199}    Subjective   Alejo is a 71 year old{ACCOMPANIED BY STATEMENT (Optional):845997}, presenting for the following health issues:  No chief complaint on file.      HPI     Concern - elevated blood sugar  Onset: ***  Description: ***  Intensity: {.:895476}  Progression of Symptoms:  {.:124025}  Accompanying Signs & Symptoms: ***  Previous history of similar problem: ***  Precipitating factors:        Worsened by: ***  Alleviating factors:        Improved by: ***  Therapies tried and outcome: {NONE DEFAULTED:193290::\" none \"}    {additonal problems for provider to add (Optional):287784}    Review of Systems   {ROS COMP (Optional):807140}      Objective    There were no vitals taken for this visit.  There is no height or weight on file to calculate BMI.  Physical Exam   {Exam List (Optional):872876}    {Diagnostic Test Results (Optional):710757}    {AMBULATORY ATTESTATION (Optional):333421}            .  ..  "

## 2022-08-24 ENCOUNTER — OFFICE VISIT (OUTPATIENT)
Dept: FAMILY MEDICINE | Facility: OTHER | Age: 72
End: 2022-08-24
Attending: NURSE PRACTITIONER
Payer: COMMERCIAL

## 2022-08-24 VITALS
OXYGEN SATURATION: 99 % | HEIGHT: 62 IN | TEMPERATURE: 97.7 F | RESPIRATION RATE: 20 BRPM | BODY MASS INDEX: 45.27 KG/M2 | HEART RATE: 84 BPM | DIASTOLIC BLOOD PRESSURE: 104 MMHG | SYSTOLIC BLOOD PRESSURE: 158 MMHG | WEIGHT: 246 LBS

## 2022-08-24 DIAGNOSIS — R03.0 ELEVATED BLOOD PRESSURE READING WITHOUT DIAGNOSIS OF HYPERTENSION: ICD-10-CM

## 2022-08-24 DIAGNOSIS — R60.0 LOCALIZED EDEMA: Primary | ICD-10-CM

## 2022-08-24 DIAGNOSIS — I10 ESSENTIAL HYPERTENSION: ICD-10-CM

## 2022-08-24 LAB
ALBUMIN SERPL-MCNC: 3.5 G/DL (ref 3.4–5)
ALP SERPL-CCNC: 73 U/L (ref 40–150)
ALT SERPL W P-5'-P-CCNC: 13 U/L (ref 0–50)
ANION GAP SERPL CALCULATED.3IONS-SCNC: 6 MMOL/L (ref 3–14)
AST SERPL W P-5'-P-CCNC: 12 U/L (ref 0–45)
BILIRUB SERPL-MCNC: 0.3 MG/DL (ref 0.2–1.3)
BUN SERPL-MCNC: 15 MG/DL (ref 7–30)
CALCIUM SERPL-MCNC: 9.2 MG/DL (ref 8.5–10.1)
CHLORIDE BLD-SCNC: 109 MMOL/L (ref 94–109)
CO2 SERPL-SCNC: 26 MMOL/L (ref 20–32)
CREAT SERPL-MCNC: 0.49 MG/DL (ref 0.52–1.04)
EST. AVERAGE GLUCOSE BLD GHB EST-MCNC: 80 MG/DL
GFR SERPL CREATININE-BSD FRML MDRD: >90 ML/MIN/1.73M2
GLUCOSE BLD-MCNC: 87 MG/DL (ref 70–99)
HBA1C MFR BLD: 4.4 % (ref 0–5.6)
POTASSIUM BLD-SCNC: 4.1 MMOL/L (ref 3.4–5.3)
PROT SERPL-MCNC: 7.5 G/DL (ref 6.8–8.8)
SODIUM SERPL-SCNC: 141 MMOL/L (ref 133–144)

## 2022-08-24 PROCEDURE — 80053 COMPREHEN METABOLIC PANEL: CPT | Mod: ZL | Performed by: NURSE PRACTITIONER

## 2022-08-24 PROCEDURE — 36415 COLL VENOUS BLD VENIPUNCTURE: CPT | Mod: ZL | Performed by: NURSE PRACTITIONER

## 2022-08-24 PROCEDURE — 93005 ELECTROCARDIOGRAM TRACING: CPT

## 2022-08-24 PROCEDURE — G0463 HOSPITAL OUTPT CLINIC VISIT: HCPCS | Mod: 25

## 2022-08-24 PROCEDURE — 93010 ELECTROCARDIOGRAM REPORT: CPT | Performed by: INTERNAL MEDICINE

## 2022-08-24 PROCEDURE — 99214 OFFICE O/P EST MOD 30 MIN: CPT | Performed by: NURSE PRACTITIONER

## 2022-08-24 PROCEDURE — 83036 HEMOGLOBIN GLYCOSYLATED A1C: CPT | Mod: ZL | Performed by: NURSE PRACTITIONER

## 2022-08-24 PROCEDURE — 82040 ASSAY OF SERUM ALBUMIN: CPT | Mod: ZL | Performed by: NURSE PRACTITIONER

## 2022-08-24 RX ORDER — FUROSEMIDE 20 MG
TABLET ORAL
Qty: 90 TABLET | Refills: 0 | Status: SHIPPED | OUTPATIENT
Start: 2022-08-24 | End: 2022-11-22

## 2022-08-24 RX ORDER — PREDNISONE 5 MG/1
TABLET ORAL
COMMUNITY
Start: 2022-06-24 | End: 2022-08-24

## 2022-08-24 RX ORDER — LISINOPRIL 10 MG/1
10 TABLET ORAL DAILY
Qty: 90 TABLET | Refills: 0 | Status: SHIPPED | OUTPATIENT
Start: 2022-08-24 | End: 2022-09-13

## 2022-08-24 ASSESSMENT — PAIN SCALES - GENERAL: PAINLEVEL: MODERATE PAIN (5)

## 2022-08-24 NOTE — PROGRESS NOTES
"  Assessment & Plan      I elected to start Alejo on Lisinopril as she has Lasix as needed for leg swelling. For electrolyte imbalance risk I decided against hydrochlorothiazide. She was educated on side effects of Lisinopril with chronic cough and angioedema.     On chart review, BP in December 2021 was elevated at 154/100 and heart rate 96    (R60.0) Localized edema  (primary encounter diagnosis)  Comment: RF  Plan: furosemide (LASIX) 20 MG tablet            (R03.0) Elevated blood pressure reading without diagnosis of hypertension  Comment: check labs   Plan: Comprehensive metabolic panel (BMP + Alb, Alk         Phos, ALT, AST, Total. Bili, TP)            (I10) Essential hypertension  Comment: EKG today. Start Lisinopril. Discussed side effects. Low salt diet.   Plan: lisinopril (ZESTRIL) 10 MG tablet, EKG 12-lead         complete w/read - (Clinic Performed)            BMI:   Estimated body mass index is 44.99 kg/m  as calculated from the following:    Height as of this encounter: 1.575 m (5' 2\").    Weight as of this encounter: 111.6 kg (246 lb).   Weight management plan: Discussed healthy diet and exercise guidelines    See Patient Instructions    Return in about 2 weeks (around 9/7/2022) for BP follow up with lab .    Carmen Mallory NP  [facility]   Alejo is a 71 year old presenting for the following health issues:  Hypertension      HPI     Hypertension Follow-up      Do you check your blood pressure regularly outside of the clinic? No     Are you following a low salt diet? Yes    Are your blood pressures ever more than 140 on the top number (systolic) OR more   than 90 on the bottom number (diastolic), for example 140/90? Yes   Denies chest pain, SOB, or dizziness.   She was seen recently by rheumatology and was noted to have elevated BP. She was encouraged to follow up.      Review of Systems   Constitutional, HEENT, cardiovascular, pulmonary, gi and gu systems " "are negative, except as otherwise noted.      Objective    BP (!) 158/104 (BP Location: Right arm, Patient Position: Sitting, Cuff Size: Adult Large)   Pulse 84   Temp 97.7  F (36.5  C) (Tympanic)   Resp 20   Ht 1.575 m (5' 2\")   Wt 111.6 kg (246 lb)   SpO2 99%   BMI 44.99 kg/m    Body mass index is 44.99 kg/m .  Physical Exam   GENERAL: healthy, alert and no distress  NECK: no adenopathy, no asymmetry, masses, or scars and thyroid normal to palpation  RESP: lungs clear to auscultation - no rales, rhonchi or wheezes  CV: regular rate and rhythm, normal S1 S2, no S3 or S4, no murmur, click or rub, +2 non pitting edema to lower extremities and peripheral pulses strong  MS: no gross musculoskeletal defects noted, +2 non pitting edema to bilateral lower extremities   SKIN: no suspicious lesions or rashes  NEURO: Normal strength and tone, mentation intact and speech normal  PSYCH: mentation appears normal, affect normal/bright      "

## 2022-09-04 ENCOUNTER — HEALTH MAINTENANCE LETTER (OUTPATIENT)
Age: 72
End: 2022-09-04

## 2022-09-13 ENCOUNTER — OFFICE VISIT (OUTPATIENT)
Dept: FAMILY MEDICINE | Facility: OTHER | Age: 72
End: 2022-09-13
Attending: NURSE PRACTITIONER
Payer: COMMERCIAL

## 2022-09-13 VITALS
TEMPERATURE: 99.4 F | BODY MASS INDEX: 45.99 KG/M2 | SYSTOLIC BLOOD PRESSURE: 130 MMHG | WEIGHT: 249.9 LBS | OXYGEN SATURATION: 95 % | HEART RATE: 99 BPM | HEIGHT: 62 IN | DIASTOLIC BLOOD PRESSURE: 80 MMHG

## 2022-09-13 DIAGNOSIS — R05.8 ACE-INHIBITOR COUGH: ICD-10-CM

## 2022-09-13 DIAGNOSIS — T46.4X5A ACE-INHIBITOR COUGH: ICD-10-CM

## 2022-09-13 DIAGNOSIS — I10 ESSENTIAL HYPERTENSION: Primary | ICD-10-CM

## 2022-09-13 PROCEDURE — 82310 ASSAY OF CALCIUM: CPT | Mod: ZL | Performed by: NURSE PRACTITIONER

## 2022-09-13 PROCEDURE — G0463 HOSPITAL OUTPT CLINIC VISIT: HCPCS

## 2022-09-13 PROCEDURE — 99213 OFFICE O/P EST LOW 20 MIN: CPT | Performed by: NURSE PRACTITIONER

## 2022-09-13 PROCEDURE — 36415 COLL VENOUS BLD VENIPUNCTURE: CPT | Mod: ZL | Performed by: NURSE PRACTITIONER

## 2022-09-13 RX ORDER — LOSARTAN POTASSIUM 50 MG/1
50 TABLET ORAL DAILY
Qty: 30 TABLET | Refills: 0 | Status: SHIPPED | OUTPATIENT
Start: 2022-09-13 | End: 2022-09-27

## 2022-09-13 ASSESSMENT — ANXIETY QUESTIONNAIRES
GAD7 TOTAL SCORE: 0
3. WORRYING TOO MUCH ABOUT DIFFERENT THINGS: NOT AT ALL
2. NOT BEING ABLE TO STOP OR CONTROL WORRYING: NOT AT ALL
GAD7 TOTAL SCORE: 0
5. BEING SO RESTLESS THAT IT IS HARD TO SIT STILL: NOT AT ALL
1. FEELING NERVOUS, ANXIOUS, OR ON EDGE: NOT AT ALL
4. TROUBLE RELAXING: NOT AT ALL
6. BECOMING EASILY ANNOYED OR IRRITABLE: NOT AT ALL
7. FEELING AFRAID AS IF SOMETHING AWFUL MIGHT HAPPEN: NOT AT ALL

## 2022-09-13 ASSESSMENT — PAIN SCALES - GENERAL: PAINLEVEL: SEVERE PAIN (6)

## 2022-09-13 ASSESSMENT — PATIENT HEALTH QUESTIONNAIRE - PHQ9: SUM OF ALL RESPONSES TO PHQ QUESTIONS 1-9: 2

## 2022-09-13 NOTE — NURSING NOTE
"Chief Complaint   Patient presents with     Hypertension       Initial /80 (BP Location: Right arm, Patient Position: Sitting, Cuff Size: Adult Regular)   Pulse 99   Temp 99.4  F (37.4  C) (Tympanic)   Ht 1.575 m (5' 2\")   Wt 113.4 kg (249 lb 14.4 oz)   SpO2 95%   BMI 45.71 kg/m   Estimated body mass index is 45.71 kg/m  as calculated from the following:    Height as of this encounter: 1.575 m (5' 2\").    Weight as of this encounter: 113.4 kg (249 lb 14.4 oz).  Medication Reconciliation: complete  Charity Galdamez LPN  "

## 2022-09-13 NOTE — PROGRESS NOTES
"  Assessment & Plan     Great response with BP with Lisinopril use. Unfortunately, Alejo developed a dry nagging cough after 1 week of being on Lisinopril. Will discontinue Lisinopril and start Cozaar and follow up in 2-4 weeks for BP check with BMP.     (I10) Essential hypertension  (primary encounter diagnosis)  Comment: stop Lisinopril and start Cozaar   Plan: losartan (COZAAR) 50 MG tablet, Basic metabolic        panel            (R05.8,  T46.4X5A) ACE-inhibitor cough  Comment: stop lisinopril. No throat swelling or difficulty swallowing   Plan: as above        See Patient Instructions    Return in about 2 weeks (around 9/27/2022) for 2-4 weeks follow up blood pressure with labs. .    Carmen Mallory NP  [facility]   Alejo is a 71 year old, presenting for the following health issues:  Hypertension      HPI     Hypertension Follow-up      Do you check your blood pressure regularly outside of the clinic? Yes ;has been coughing a lot since starting this medication would like to change it.     Are you following a low salt diet? Yes    Are your blood pressures ever more than 140 on the top number (systolic) OR more   than 90 on the bottom number (diastolic), for example 140/90? No      Review of Systems   Constitutional, HEENT, cardiovascular, pulmonary, gi and gu systems are negative, except as otherwise noted.      Objective    /80 (BP Location: Right arm, Patient Position: Sitting, Cuff Size: Adult Regular)   Pulse 99   Temp 99.4  F (37.4  C) (Tympanic)   Ht 1.575 m (5' 2\")   Wt 113.4 kg (249 lb 14.4 oz)   SpO2 95%   BMI 45.71 kg/m    Body mass index is 45.71 kg/m .  Physical Exam   GENERAL: healthy, alert and no distress  NECK: no adenopathy, no asymmetry, masses, or scars and thyroid normal to palpation  RESP: lungs clear to auscultation - no rales, rhonchi or wheezes  CV: regular rate and rhythm, normal S1 S2, no S3 or S4, no murmur, click or rub, no " peripheral edema and peripheral pulses strong  MS: no gross musculoskeletal defects noted, no edema  SKIN: no suspicious lesions or rashes  NEURO: Normal strength and tone, mentation intact and speech normal  PSYCH: mentation appears normal, affect normal/bright

## 2022-09-14 LAB
ANION GAP SERPL CALCULATED.3IONS-SCNC: 5 MMOL/L (ref 3–14)
BUN SERPL-MCNC: 31 MG/DL (ref 7–30)
CALCIUM SERPL-MCNC: 9.3 MG/DL (ref 8.5–10.1)
CHLORIDE BLD-SCNC: 109 MMOL/L (ref 94–109)
CO2 SERPL-SCNC: 27 MMOL/L (ref 20–32)
CREAT SERPL-MCNC: 0.79 MG/DL (ref 0.52–1.04)
GFR SERPL CREATININE-BSD FRML MDRD: 80 ML/MIN/1.73M2
GLUCOSE BLD-MCNC: 117 MG/DL (ref 70–99)
POTASSIUM BLD-SCNC: 4.1 MMOL/L (ref 3.4–5.3)
SODIUM SERPL-SCNC: 141 MMOL/L (ref 133–144)

## 2022-09-27 ENCOUNTER — OFFICE VISIT (OUTPATIENT)
Dept: FAMILY MEDICINE | Facility: OTHER | Age: 72
End: 2022-09-27
Attending: NURSE PRACTITIONER
Payer: COMMERCIAL

## 2022-09-27 VITALS
HEART RATE: 95 BPM | SYSTOLIC BLOOD PRESSURE: 130 MMHG | TEMPERATURE: 98.1 F | OXYGEN SATURATION: 93 % | DIASTOLIC BLOOD PRESSURE: 70 MMHG | HEIGHT: 62 IN | BODY MASS INDEX: 46.19 KG/M2 | WEIGHT: 251 LBS

## 2022-09-27 DIAGNOSIS — I10 ESSENTIAL HYPERTENSION: Primary | ICD-10-CM

## 2022-09-27 PROCEDURE — G0463 HOSPITAL OUTPT CLINIC VISIT: HCPCS

## 2022-09-27 PROCEDURE — 80048 BASIC METABOLIC PNL TOTAL CA: CPT | Mod: ZL | Performed by: NURSE PRACTITIONER

## 2022-09-27 PROCEDURE — 99213 OFFICE O/P EST LOW 20 MIN: CPT | Performed by: NURSE PRACTITIONER

## 2022-09-27 PROCEDURE — 36415 COLL VENOUS BLD VENIPUNCTURE: CPT | Mod: ZL | Performed by: NURSE PRACTITIONER

## 2022-09-27 RX ORDER — LOSARTAN POTASSIUM 50 MG/1
50 TABLET ORAL DAILY
Qty: 90 TABLET | Refills: 1 | Status: SHIPPED | OUTPATIENT
Start: 2022-09-27 | End: 2022-10-12

## 2022-09-27 ASSESSMENT — PAIN SCALES - GENERAL: PAINLEVEL: SEVERE PAIN (6)

## 2022-09-27 NOTE — PROGRESS NOTES
"  Assessment & Plan     (I10) Essential hypertension  (primary encounter diagnosis)  Comment: BP improved. Recheck BMP   Plan: Basic metabolic panel, losartan (COZAAR) 50 MG         tablet             See Patient Instructions    Return in about 3 months (around 12/27/2022) for Chronic disease/HTN follow up .    VERNA Galindo [facility]    Germain Dhillon is a 72 year old, presenting for the following health issues:  Hypertension      HPI     Hypertension Follow-up      Do you check your blood pressure regularly outside of the clinic? No     Are you following a low salt diet? No    Are your blood pressures ever more than 140 on the top number (systolic) OR more   than 90 on the bottom number (diastolic), for example 140/90? No      How many servings of fruits and vegetables do you eat daily?  2-3    On average, how many sweetened beverages do you drink each day (Examples: soda, juice, sweet tea, etc.  Do NOT count diet or artificially sweetened beverages)?   0    How many days per week do you exercise enough to make your heart beat faster? 3 or less    How many minutes a day do you exercise enough to make your heart beat faster? 60 or more    How many days per week do you miss taking your medication? 0      Review of Systems   Constitutional, HEENT, cardiovascular, pulmonary, gi and gu systems are negative, except as otherwise noted.      Objective    /70 (BP Location: Right arm, Patient Position: Sitting, Cuff Size: Adult Regular)   Pulse 95   Temp 98.1  F (36.7  C)   Ht 1.575 m (5' 2\")   Wt 113.9 kg (251 lb)   SpO2 93%   BMI 45.91 kg/m    Body mass index is 45.91 kg/m .  Physical Exam   GENERAL: healthy, alert and no distress  NECK: no adenopathy, no asymmetry, masses, or scars and thyroid normal to palpation  RESP: lungs clear to auscultation - no rales, rhonchi or wheezes  CV: regular rate and rhythm, normal S1 S2, no S3 or S4, no murmur, click or rub, no " peripheral edema and peripheral pulses strong  MS: no gross musculoskeletal defects noted, no edema  SKIN: no suspicious lesions or rashes  PSYCH: mentation appears normal, affect normal/bright

## 2022-09-27 NOTE — NURSING NOTE
"Chief Complaint   Patient presents with     Hypertension       Initial /70 (BP Location: Right arm, Patient Position: Sitting, Cuff Size: Adult Regular)   Pulse 95   Temp 98.1  F (36.7  C)   Ht 1.575 m (5' 2\")   Wt 113.9 kg (251 lb)   SpO2 93%   BMI 45.91 kg/m   Estimated body mass index is 45.91 kg/m  as calculated from the following:    Height as of this encounter: 1.575 m (5' 2\").    Weight as of this encounter: 113.9 kg (251 lb).  Medication Reconciliation: complete  Charity Galdamez LPN  "

## 2022-09-28 ENCOUNTER — TELEPHONE (OUTPATIENT)
Dept: FAMILY MEDICINE | Facility: OTHER | Age: 72
End: 2022-09-28

## 2022-09-28 LAB
ANION GAP SERPL CALCULATED.3IONS-SCNC: 6 MMOL/L (ref 3–14)
BUN SERPL-MCNC: 23 MG/DL (ref 7–30)
CALCIUM SERPL-MCNC: 9.4 MG/DL (ref 8.5–10.1)
CHLORIDE BLD-SCNC: 107 MMOL/L (ref 94–109)
CO2 SERPL-SCNC: 28 MMOL/L (ref 20–32)
CREAT SERPL-MCNC: 0.74 MG/DL (ref 0.52–1.04)
GFR SERPL CREATININE-BSD FRML MDRD: 86 ML/MIN/1.73M2
GLUCOSE BLD-MCNC: 123 MG/DL (ref 70–99)
POTASSIUM BLD-SCNC: 3.9 MMOL/L (ref 3.4–5.3)
SODIUM SERPL-SCNC: 141 MMOL/L (ref 133–144)

## 2022-09-28 NOTE — TELEPHONE ENCOUNTER
----- Message from VERNA Galindo CNP sent at 9/28/2022 11:58 AM CDT -----  [facility] lab.

## 2022-10-11 DIAGNOSIS — I10 ESSENTIAL HYPERTENSION: ICD-10-CM

## 2022-10-12 RX ORDER — LOSARTAN POTASSIUM 50 MG/1
TABLET ORAL
Qty: 30 TABLET | Refills: 0 | Status: SHIPPED | OUTPATIENT
Start: 2022-10-12 | End: 2023-04-17

## 2022-11-18 DIAGNOSIS — R60.0 LOCALIZED EDEMA: ICD-10-CM

## 2022-11-22 RX ORDER — FUROSEMIDE 20 MG
TABLET ORAL
Qty: 90 TABLET | Refills: 0 | Status: SHIPPED | OUTPATIENT
Start: 2022-11-22 | End: 2023-08-22

## 2023-01-15 ENCOUNTER — HEALTH MAINTENANCE LETTER (OUTPATIENT)
Age: 73
End: 2023-01-15

## 2023-03-24 RX ORDER — SULFASALAZINE 500 MG/1
1500 TABLET ORAL 2 TIMES DAILY
OUTPATIENT
Start: 2023-03-24

## 2023-03-24 NOTE — TELEPHONE ENCOUNTER
sulfa      Last Written Prescription Date:  historical  Last Fill Quantity: ,   # refills:   Last Office Visit: 9/27/22  Future Office visit:

## 2023-04-17 DIAGNOSIS — I10 ESSENTIAL HYPERTENSION: ICD-10-CM

## 2023-04-17 RX ORDER — LOSARTAN POTASSIUM 50 MG/1
TABLET ORAL
Qty: 90 TABLET | Refills: 0 | Status: SHIPPED | OUTPATIENT
Start: 2023-04-17 | End: 2023-05-18

## 2023-04-17 NOTE — TELEPHONE ENCOUNTER
COZAAR 50mg      Last Written Prescription Date:  10/12/2022  Last Fill Quantity: 30,   # refills: 0  Last Office Visit: 9/27/2022  Future Office visit:       Routing refill request to provider for review/approval because:

## 2023-05-16 DIAGNOSIS — I10 ESSENTIAL HYPERTENSION: ICD-10-CM

## 2023-05-18 RX ORDER — LOSARTAN POTASSIUM 50 MG/1
TABLET ORAL
Qty: 30 TABLET | Refills: 0 | Status: SHIPPED | OUTPATIENT
Start: 2023-05-18 | End: 2023-06-27

## 2023-06-26 DIAGNOSIS — I10 ESSENTIAL HYPERTENSION: ICD-10-CM

## 2023-06-27 RX ORDER — LOSARTAN POTASSIUM 50 MG/1
50 TABLET ORAL DAILY
Qty: 90 TABLET | Refills: 0 | Status: SHIPPED | OUTPATIENT
Start: 2023-06-27 | End: 2023-08-02

## 2023-06-27 NOTE — TELEPHONE ENCOUNTER
Losartan       Last Written Prescription Date:  5/18/2023  Last Fill Quantity: 30,   # refills: 0  Last Office Visit: 9/13/2023  Future Office visit:       Routing refill request to provider for review/approval because:

## 2023-07-18 ENCOUNTER — HOSPITAL ENCOUNTER (EMERGENCY)
Facility: HOSPITAL | Age: 73
Discharge: ANOTHER HOSPITAL | End: 2023-07-19
Attending: EMERGENCY MEDICINE | Admitting: EMERGENCY MEDICINE
Payer: COMMERCIAL

## 2023-07-18 ENCOUNTER — APPOINTMENT (OUTPATIENT)
Dept: ULTRASOUND IMAGING | Facility: HOSPITAL | Age: 73
End: 2023-07-18
Attending: EMERGENCY MEDICINE
Payer: COMMERCIAL

## 2023-07-18 ENCOUNTER — APPOINTMENT (OUTPATIENT)
Dept: GENERAL RADIOLOGY | Facility: HOSPITAL | Age: 73
End: 2023-07-18
Attending: EMERGENCY MEDICINE
Payer: COMMERCIAL

## 2023-07-18 DIAGNOSIS — R06.02 SHORTNESS OF BREATH: ICD-10-CM

## 2023-07-18 LAB
ABO/RH(D): NORMAL
ALBUMIN SERPL BCG-MCNC: 3.2 G/DL (ref 3.5–5.2)
ALP SERPL-CCNC: 75 U/L (ref 35–104)
ALT SERPL W P-5'-P-CCNC: 43 U/L (ref 0–50)
ANION GAP SERPL CALCULATED.3IONS-SCNC: 15 MMOL/L (ref 7–15)
ANTIBODY SCREEN: NEGATIVE
APTT PPP: 25 SECONDS (ref 22–38)
AST SERPL W P-5'-P-CCNC: 28 U/L (ref 0–45)
BASE EXCESS BLDV CALC-SCNC: -0.4 MMOL/L (ref -7.7–1.9)
BASOPHILS # BLD AUTO: 0 10E3/UL (ref 0–0.2)
BASOPHILS NFR BLD AUTO: 0 %
BILIRUB SERPL-MCNC: 0.3 MG/DL
BUN SERPL-MCNC: 12.8 MG/DL (ref 8–23)
CALCIUM SERPL-MCNC: 9 MG/DL (ref 8.8–10.2)
CHLORIDE SERPL-SCNC: 104 MMOL/L (ref 98–107)
CREAT SERPL-MCNC: 0.61 MG/DL (ref 0.51–0.95)
DEPRECATED HCO3 PLAS-SCNC: 22 MMOL/L (ref 22–29)
EOSINOPHIL # BLD AUTO: 0.1 10E3/UL (ref 0–0.7)
EOSINOPHIL NFR BLD AUTO: 2 %
ERYTHROCYTE [DISTWIDTH] IN BLOOD BY AUTOMATED COUNT: 14.3 % (ref 10–15)
GFR SERPL CREATININE-BSD FRML MDRD: >90 ML/MIN/1.73M2
GLUCOSE SERPL-MCNC: 147 MG/DL (ref 70–99)
HCO3 BLDV-SCNC: 25 MMOL/L (ref 21–28)
HCT VFR BLD AUTO: 30 % (ref 35–47)
HGB BLD-MCNC: 10 G/DL (ref 11.7–15.7)
IMM GRANULOCYTES # BLD: 0.1 10E3/UL
IMM GRANULOCYTES NFR BLD: 1 %
INR PPP: 1.08 (ref 0.85–1.15)
LACTATE SERPL-SCNC: 1.6 MMOL/L (ref 0.7–2)
LYMPHOCYTES # BLD AUTO: 1.7 10E3/UL (ref 0.8–5.3)
LYMPHOCYTES NFR BLD AUTO: 20 %
MCH RBC QN AUTO: 30.3 PG (ref 26.5–33)
MCHC RBC AUTO-ENTMCNC: 33.3 G/DL (ref 31.5–36.5)
MCV RBC AUTO: 91 FL (ref 78–100)
MONOCYTES # BLD AUTO: 0.5 10E3/UL (ref 0–1.3)
MONOCYTES NFR BLD AUTO: 6 %
NEUTROPHILS # BLD AUTO: 5.9 10E3/UL (ref 1.6–8.3)
NEUTROPHILS NFR BLD AUTO: 71 %
NRBC # BLD AUTO: 0 10E3/UL
NRBC BLD AUTO-RTO: 0 /100
NT-PROBNP SERPL-MCNC: 760 PG/ML (ref 0–900)
O2/TOTAL GAS SETTING VFR VENT: 36 %
OXYHGB MFR BLDV: 79 % (ref 70–75)
PCO2 BLDV: 40 MM HG (ref 40–50)
PH BLDV: 7.39 [PH] (ref 7.32–7.43)
PLATELET # BLD AUTO: 469 10E3/UL (ref 150–450)
PO2 BLDV: 51 MM HG (ref 25–47)
POTASSIUM SERPL-SCNC: 3.5 MMOL/L (ref 3.4–5.3)
PROCALCITONIN SERPL IA-MCNC: 0.05 NG/ML
PROT SERPL-MCNC: 7.3 G/DL (ref 6.4–8.3)
RBC # BLD AUTO: 3.3 10E6/UL (ref 3.8–5.2)
SODIUM SERPL-SCNC: 141 MMOL/L (ref 136–145)
SPECIMEN EXPIRATION DATE: NORMAL
TROPONIN T SERPL HS-MCNC: 15 NG/L
WBC # BLD AUTO: 8.3 10E3/UL (ref 4–11)

## 2023-07-18 PROCEDURE — 93010 ELECTROCARDIOGRAM REPORT: CPT | Performed by: INTERNAL MEDICINE

## 2023-07-18 PROCEDURE — C9803 HOPD COVID-19 SPEC COLLECT: HCPCS

## 2023-07-18 PROCEDURE — 250N000009 HC RX 250: Performed by: EMERGENCY MEDICINE

## 2023-07-18 PROCEDURE — 36415 COLL VENOUS BLD VENIPUNCTURE: CPT | Performed by: EMERGENCY MEDICINE

## 2023-07-18 PROCEDURE — 83880 ASSAY OF NATRIURETIC PEPTIDE: CPT | Performed by: EMERGENCY MEDICINE

## 2023-07-18 PROCEDURE — 93308 TTE F-UP OR LMTD: CPT | Mod: TC

## 2023-07-18 PROCEDURE — 87040 BLOOD CULTURE FOR BACTERIA: CPT | Performed by: EMERGENCY MEDICINE

## 2023-07-18 PROCEDURE — 71045 X-RAY EXAM CHEST 1 VIEW: CPT

## 2023-07-18 PROCEDURE — 82805 BLOOD GASES W/O2 SATURATION: CPT | Performed by: EMERGENCY MEDICINE

## 2023-07-18 PROCEDURE — 93308 TTE F-UP OR LMTD: CPT | Mod: 26 | Performed by: EMERGENCY MEDICINE

## 2023-07-18 PROCEDURE — 84484 ASSAY OF TROPONIN QUANT: CPT | Performed by: EMERGENCY MEDICINE

## 2023-07-18 PROCEDURE — 86901 BLOOD TYPING SEROLOGIC RH(D): CPT | Performed by: EMERGENCY MEDICINE

## 2023-07-18 PROCEDURE — 85610 PROTHROMBIN TIME: CPT | Performed by: EMERGENCY MEDICINE

## 2023-07-18 PROCEDURE — 83605 ASSAY OF LACTIC ACID: CPT | Performed by: EMERGENCY MEDICINE

## 2023-07-18 PROCEDURE — 999N000157 HC STATISTIC RCP TIME EA 10 MIN

## 2023-07-18 PROCEDURE — 85025 COMPLETE CBC W/AUTO DIFF WBC: CPT | Performed by: EMERGENCY MEDICINE

## 2023-07-18 PROCEDURE — 80053 COMPREHEN METABOLIC PANEL: CPT | Performed by: EMERGENCY MEDICINE

## 2023-07-18 PROCEDURE — 99291 CRITICAL CARE FIRST HOUR: CPT | Mod: 25 | Performed by: EMERGENCY MEDICINE

## 2023-07-18 PROCEDURE — 93005 ELECTROCARDIOGRAM TRACING: CPT

## 2023-07-18 PROCEDURE — 94640 AIRWAY INHALATION TREATMENT: CPT

## 2023-07-18 PROCEDURE — 84145 PROCALCITONIN (PCT): CPT | Mod: GZ | Performed by: EMERGENCY MEDICINE

## 2023-07-18 PROCEDURE — 99291 CRITICAL CARE FIRST HOUR: CPT | Mod: 25

## 2023-07-18 PROCEDURE — 85730 THROMBOPLASTIN TIME PARTIAL: CPT | Performed by: EMERGENCY MEDICINE

## 2023-07-18 RX ORDER — IPRATROPIUM BROMIDE AND ALBUTEROL SULFATE 2.5; .5 MG/3ML; MG/3ML
3 SOLUTION RESPIRATORY (INHALATION) ONCE
Status: COMPLETED | OUTPATIENT
Start: 2023-07-18 | End: 2023-07-18

## 2023-07-18 RX ADMIN — IPRATROPIUM BROMIDE AND ALBUTEROL SULFATE 3 ML: .5; 3 SOLUTION RESPIRATORY (INHALATION) at 23:29

## 2023-07-19 ENCOUNTER — TRANSFERRED RECORDS (OUTPATIENT)
Dept: HEALTH INFORMATION MANAGEMENT | Facility: CLINIC | Age: 73
End: 2023-07-19

## 2023-07-19 VITALS
OXYGEN SATURATION: 93 % | TEMPERATURE: 98 F | DIASTOLIC BLOOD PRESSURE: 93 MMHG | RESPIRATION RATE: 14 BRPM | HEART RATE: 92 BPM | SYSTOLIC BLOOD PRESSURE: 155 MMHG

## 2023-07-19 LAB
EJECTION FRACTION: NORMAL %
FLUAV RNA SPEC QL NAA+PROBE: NEGATIVE
FLUBV RNA RESP QL NAA+PROBE: NEGATIVE
HOLD SPECIMEN: NORMAL
RSV RNA SPEC NAA+PROBE: NEGATIVE
SARS-COV-2 RNA RESP QL NAA+PROBE: NEGATIVE
TROPONIN T SERPL HS-MCNC: 14 NG/L

## 2023-07-19 PROCEDURE — 36415 COLL VENOUS BLD VENIPUNCTURE: CPT | Performed by: EMERGENCY MEDICINE

## 2023-07-19 PROCEDURE — 94640 AIRWAY INHALATION TREATMENT: CPT

## 2023-07-19 PROCEDURE — 258N000003 HC RX IP 258 OP 636: Performed by: EMERGENCY MEDICINE

## 2023-07-19 PROCEDURE — 250N000011 HC RX IP 250 OP 636: Mod: JZ | Performed by: EMERGENCY MEDICINE

## 2023-07-19 PROCEDURE — 999N000157 HC STATISTIC RCP TIME EA 10 MIN

## 2023-07-19 PROCEDURE — 87637 SARSCOV2&INF A&B&RSV AMP PRB: CPT | Performed by: EMERGENCY MEDICINE

## 2023-07-19 PROCEDURE — 96375 TX/PRO/DX INJ NEW DRUG ADDON: CPT

## 2023-07-19 PROCEDURE — C9803 HOPD COVID-19 SPEC COLLECT: HCPCS

## 2023-07-19 PROCEDURE — 96365 THER/PROPH/DIAG IV INF INIT: CPT

## 2023-07-19 PROCEDURE — 250N000009 HC RX 250: Performed by: EMERGENCY MEDICINE

## 2023-07-19 PROCEDURE — 84484 ASSAY OF TROPONIN QUANT: CPT | Performed by: EMERGENCY MEDICINE

## 2023-07-19 RX ORDER — AZITHROMYCIN 500 MG/5ML
500 INJECTION, POWDER, LYOPHILIZED, FOR SOLUTION INTRAVENOUS ONCE
Status: COMPLETED | OUTPATIENT
Start: 2023-07-19 | End: 2023-07-19

## 2023-07-19 RX ORDER — IPRATROPIUM BROMIDE AND ALBUTEROL SULFATE 2.5; .5 MG/3ML; MG/3ML
3 SOLUTION RESPIRATORY (INHALATION) ONCE
Status: COMPLETED | OUTPATIENT
Start: 2023-07-19 | End: 2023-07-19

## 2023-07-19 RX ORDER — DEXAMETHASONE SODIUM PHOSPHATE 10 MG/ML
10 INJECTION, SOLUTION INTRAMUSCULAR; INTRAVENOUS ONCE
Status: COMPLETED | OUTPATIENT
Start: 2023-07-19 | End: 2023-07-19

## 2023-07-19 RX ADMIN — DEXAMETHASONE SODIUM PHOSPHATE 10 MG: 10 INJECTION, SOLUTION INTRAMUSCULAR; INTRAVENOUS at 00:39

## 2023-07-19 RX ADMIN — AZITHROMYCIN 500 MG: 500 INJECTION, POWDER, LYOPHILIZED, FOR SOLUTION INTRAVENOUS at 00:42

## 2023-07-19 RX ADMIN — IPRATROPIUM BROMIDE AND ALBUTEROL SULFATE 3 ML: .5; 3 SOLUTION RESPIRATORY (INHALATION) at 00:44

## 2023-07-19 ASSESSMENT — ACTIVITIES OF DAILY LIVING (ADL)
ADLS_ACUITY_SCORE: 35
ADLS_ACUITY_SCORE: 35

## 2023-07-19 NOTE — ED TRIAGE NOTES
"Patient presents via triage with sisters accompanying. Patient states \"they made me come\". Patient reports a few days history of shortness of breath and dyspnea upon exertion. Patient notes worsening shortness of breath with activity. Patient also reports feeling \"congested\" with a cough, but non-productive. Patient notes that she has had leg swelling for \"years\", but states she has never been told why. Patient's sats in triage were noted to be 86% on RA. Patient to ED room 1 via wheelchair. Patient able to stand and transfer and change into gown, but is very dyspneic. O2 sats on RA once in bed were mid 70's. Patient placed on O2 via NC at 4L and sats up to low 90's.       "

## 2023-07-20 ASSESSMENT — ENCOUNTER SYMPTOMS
FEVER: 0
COUGH: 1
SHORTNESS OF BREATH: 1
CHILLS: 0

## 2023-07-20 NOTE — ED PROVIDER NOTES
History     Chief Complaint   Patient presents with     Shortness of Breath     Worsening shortness of breath with activity     HPI  Alejo Fields is a 72 year old female who is here with difficulty breathing.  Onset yesterday got worse today.  With cough.  Legs are swollen however states are always swollen, takes a water pill for that.  Denies any history of cardiac work-up in the past.  Only here because her sisters meter.  Used to smoke but no longer does.    Allergies:  No Known Allergies    Problem List:    Patient Active Problem List    Diagnosis Date Noted     RA (rheumatoid arthritis) (H) 12/14/2021     Priority: Medium     High risk medication use 09/15/2015     Priority: Medium        Past Medical History:    Past Medical History:   Diagnosis Date     Breast mass 1978       Past Surgical History:    Past Surgical History:   Procedure Laterality Date     BIOPSY      breast bx     BIOPSY BREAST Bilateral 1978    benign     COLONOSCOPY N/A 3/24/2017    Procedure: COLONOSCOPY;  Surgeon: Felix Fermin MD;  Location: [facility] OR     COMBINED COLONOSCOPY WITH ARGON PLASMA COAGULATOR (APC) N/A 9/2/2016    Procedure: COMBINED COLONOSCOPY WITH ARGON PLASMA COAGULATOR (APC);  Surgeon: Felix Fermin MD;  Location: [facility] OR     LUMPECTOMY BREAST Bilateral 1978    benign       Family History:    Family History   Problem Relation Age of Onset     Cerebrovascular Disease Mother      Diabetes Mother      Coronary Artery Disease Mother      Musculoskeletal Disorder Father      Thyroid Disease Sister      Obesity Sister      Suicide Son      Hypertension No family hx of      Hyperlipidemia No family hx of      Breast Cancer No family hx of      Colon Cancer No family hx of      Prostate Cancer No family hx of      Other Cancer No family hx of      Depression No family hx of      Anxiety Disorder No family hx of      Mental Illness No family hx of      Substance Abuse No family hx of      Anesthesia Reaction No family hx of       Asthma No family hx of      Osteoporosis No family hx of      Genetic Disorder No family hx of        Social History:  Marital Status:   [5]  Social History     Tobacco Use     Smoking status: Former     Smokeless tobacco: Never        Medications:    FOLIC ACID PO  furosemide (LASIX) 20 MG tablet  losartan (COZAAR) 50 MG tablet  methotrexate 2.5 MG tablet  sulfaSALAzine (AZULFIDINE) 500 MG tablet          Review of Systems   Constitutional: Negative for chills and fever.   Respiratory: Positive for cough and shortness of breath.    All other systems reviewed and are negative.      Physical Exam   BP: 161/76  Pulse: 107  Temp: 99.5  F (37.5  C)  Resp: 24  SpO2: (!) 87 %      Physical Exam  Constitutional:       General: She is not in acute distress.     Appearance: She is obese. She is not diaphoretic.   HENT:      Head: Normocephalic and atraumatic.      Right Ear: External ear normal.      Left Ear: External ear normal.      Nose: No congestion or rhinorrhea.      Mouth/Throat:      Pharynx: Oropharynx is clear. No oropharyngeal exudate.   Eyes:      General: No scleral icterus.     Pupils: Pupils are equal, round, and reactive to light.   Cardiovascular:      Rate and Rhythm: Normal rate and regular rhythm.      Heart sounds: Normal heart sounds.   Pulmonary:      Effort: No respiratory distress.      Breath sounds: Decreased breath sounds and wheezing present.   Abdominal:      General: Bowel sounds are normal.      Palpations: Abdomen is soft.      Tenderness: There is no abdominal tenderness.   Musculoskeletal:         General: No tenderness.      Cervical back: Normal range of motion and neck supple.      Right lower leg: No tenderness. Edema present.      Left lower leg: No tenderness. Edema present.   Skin:     General: Skin is warm.      Capillary Refill: Capillary refill takes less than 2 seconds.      Findings: No rash.   Neurological:      Mental Status: Mental status is at baseline.      Cranial  Nerves: No cranial nerve deficit.   Psychiatric:         Mood and Affect: Mood normal.         Behavior: Behavior normal.         ED Course                 Procedures    Results for orders placed during the hospital encounter of 07/18/23    POC US ECHO LIMITED    [facility] Procedure Note    Limited Bedside ED Cardiac Ultrasound:    PROCEDURE: PERFORMED BY: Dr. Scar Alberto MD  INDICATIONS/SYMPTOM:  Shortness of Breath  PROBE: Cardiac phased array probe  BODY LOCATION: Chest  FINDINGS:  The ultrasound was performed utilizing the subcostal, parasternal long axis, parasternal short axis, and apical 4 chamber views.  Cardiac contractility:  Present  Gross estimation of cardiac kinesis: normal  Pericardial Effusion:  None  RV:LV ratio: LV > RV  INTERPRETATION:    Chamber size and motion were grossly normal with LV > RV, normal cardiac kinesis.  No pericardial effusion was found.  IVC visualized and findings indicate normovolemia.  IMAGE DOCUMENTATION: Images were archived to PACs system.            EKG Interpretation:      Interpreted by Scar Alberto MD  Time reviewed:   Symptoms at time of EKG: sob   Rhythm: normal sinus   Rate: Normal  Axis: Normal  Ectopy: premature ventricular contractions (unifocal)  Conduction: normal  ST Segments/ T Waves: No acute ischemic changes  Q Waves: none  Comparison to prior:     Clinical Impression: non-specific EKG                Critical Care time:  was 69 minutes for this patient excluding procedures.             No results found for this or any previous visit (from the past 24 hour(s)).    Medications   ipratropium - albuterol 0.5 mg/2.5 mg/3 mL (DUONEB) neb solution 3 mL (3 mLs Nebulization $Given 7/18/23 3020)   ipratropium - albuterol 0.5 mg/2.5 mg/3 mL (DUONEB) neb solution 3 mL (3 mLs Nebulization $Given 7/19/23 0044)   dexamethasone PF (DECADRON) injection 10 mg (10 mg Intravenous $Given 7/19/23 0039)   azithromycin 500 mg (ZITHROMAX) in 0.9% NaCl 250 mL  intermittent infusion 500 mg (0 mg Intravenous Stopped 7/19/23 0148)       Assessments & Plan (with Medical Decision Making)     I have reviewed the nursing notes.    I have reviewed the findings, diagnosis, plan and need for follow up with the patient.    Critical Care Addendum  My initial assessment, based on my review of nursing observations, review of vital signs, focused history, physical exam, review of cardiac rhythm monitor and 12 lead ECG analysis, established a high suspicion that Alejo Fields has respiratory distress, which requires immediate intervention, and therefore she is critically ill.     After the initial assessment, the care team initiated multiple lab tests, initiated IV fluid administration and initiated medication therapy with decadron, azithro to provide stabilization care. Due to the critical nature of this patient, I reassessed nursing observations, vital signs, physical exam, review of cardiac rhythm monitor, 12 lead ECG analysis, mental status, neurologic status and respiratory status multiple times prior to her disposition.     Time also spent performing documentation, discussion with family to obtain medical information for decision making, reviewing test results and coordination of care.     Critical care time (excluding teaching time and procedures): 69 minutes.         Medical Decision Making  The patient's presentation was of high complexity (an acute health issue posing potential threat to life or bodily function).    The patient's evaluation involved:  an assessment requiring an independent historian (see separate area of note for details)  ordering and/or review of 3+ test(s) in this encounter (see separate area of note for details)    The patient's management necessitated high risk (a decision regarding hospitalization).    72-year-old female here with shortness of breath.  Undifferentiated.  Top of differential is COPD exacerbation and new onset CHF.  Patient with  bilateral B-lines to both apices, however no obvious decrease in contractility.  Patient has some wheezing however very minimal but not moving a lot of air.  Swelling in lower extremities chronic however may be worse.  BNP not markedly elevated however is obese and BNP can be falsely reassuring in that population.  Ultimately will transfer as she would benefit from cardiology and we have no beds here.    Discharge Medication List as of 7/19/2023  3:18 AM          Final diagnoses:   Shortness of breath       7/18/2023   [facility]     Scar Alberto MD  07/20/23 0152

## 2023-07-24 LAB
BACTERIA BLD CULT: NO GROWTH
BACTERIA BLD CULT: NO GROWTH

## 2023-08-01 ENCOUNTER — OFFICE VISIT (OUTPATIENT)
Dept: FAMILY MEDICINE | Facility: OTHER | Age: 73
End: 2023-08-01
Attending: NURSE PRACTITIONER
Payer: COMMERCIAL

## 2023-08-01 VITALS
RESPIRATION RATE: 16 BRPM | BODY MASS INDEX: 43.79 KG/M2 | OXYGEN SATURATION: 96 % | HEART RATE: 86 BPM | DIASTOLIC BLOOD PRESSURE: 80 MMHG | WEIGHT: 239.44 LBS | SYSTOLIC BLOOD PRESSURE: 152 MMHG | TEMPERATURE: 98.6 F

## 2023-08-01 DIAGNOSIS — I10 ESSENTIAL HYPERTENSION: Primary | ICD-10-CM

## 2023-08-01 DIAGNOSIS — R60.0 LOCALIZED EDEMA: ICD-10-CM

## 2023-08-01 DIAGNOSIS — J96.00 COPD WITH RESPIRATORY FAILURE, ACUTE (H): ICD-10-CM

## 2023-08-01 DIAGNOSIS — J44.1 COPD WITH RESPIRATORY FAILURE, ACUTE (H): ICD-10-CM

## 2023-08-01 DIAGNOSIS — Z09 HOSPITAL DISCHARGE FOLLOW-UP: ICD-10-CM

## 2023-08-01 PROBLEM — G89.29 CHRONIC PAIN OF RIGHT KNEE: Status: ACTIVE | Noted: 2023-02-14

## 2023-08-01 PROBLEM — M15.0 PRIMARY OSTEOARTHRITIS INVOLVING MULTIPLE JOINTS: Status: ACTIVE | Noted: 2022-10-14

## 2023-08-01 PROBLEM — M25.561 CHRONIC PAIN OF RIGHT KNEE: Status: ACTIVE | Noted: 2023-02-14

## 2023-08-01 PROCEDURE — 99495 TRANSJ CARE MGMT MOD F2F 14D: CPT | Performed by: NURSE PRACTITIONER

## 2023-08-01 PROCEDURE — G0463 HOSPITAL OUTPT CLINIC VISIT: HCPCS

## 2023-08-01 PROCEDURE — 99214 OFFICE O/P EST MOD 30 MIN: CPT | Performed by: NURSE PRACTITIONER

## 2023-08-01 RX ORDER — ALBUTEROL SULFATE 90 UG/1
2 AEROSOL, METERED RESPIRATORY (INHALATION)
COMMUNITY
Start: 2023-07-24 | End: 2023-11-14

## 2023-08-01 ASSESSMENT — PAIN SCALES - GENERAL: PAINLEVEL: NO PAIN (0)

## 2023-08-01 NOTE — PROGRESS NOTES
"  Assessment & Plan     (I10) Essential hypertension  (primary encounter diagnosis)  Comment: BP elevated. Increase Cozaar to 75 mg daily. Check echocardiogram   Plan: Echocardiogram Complete, losartan (COZAAR) 50         MG tablet            (R60.0) Localized edema  Comment: check echocardiogram   Plan: Echocardiogram Complete            (J44.9,  J96.00) COPD with respiratory failure, acute (H)  Comment: New onset COPD DX with recent hospitalization. Check PFT. Ref to pulmonology   Plan: General PFT Lab (Please always keep checked),         Pulmonary Function Test, Adult Pulmonary         Medicine  Referral      (Z09) Hospital discharge follow-up  Comment: feeling much better. Denies SOB   Plan: PFT, echocardiogram, and pulmonology consult     }   MED REC REQUIRED{    Post Medication Reconciliation Status:  Discharge medications reconciled and changed, see notes/orders  BMI:   Estimated body mass index is 43.79 kg/m  as calculated from the following:    Height as of 9/27/22: 1.575 m (5' 2\").    Weight as of this encounter: 108.6 kg (239 lb 7 oz).   Weight management plan: Discussed healthy diet and exercise guidelines    See Patient Instructions    Return in about 2 weeks (around 8/15/2023) for BP check .    VERNA Galindo CNP  [facility]   Alejo is a 72 year old, presenting for the following health issues:  Hospital F/U        8/1/2023    11:03 AM   Additional Questions   Roomed by Sailaja MONTELONGO       Hospital Follow-up Visit:    Hospital/Nursing Home/ Rehab Facility:  [facility]  Date of Admission: 7/19/23  Date of Discharge: 7/24/23  Reason(s) for Admission: COPD with respiratory failure     Was your hospitalization related to COVID-19? No   Problems taking medications regularly:  None  Medication changes since discharge: None  Problems adhering to non-medication therapy:  None  Patient states that she is feeling much better  Not using " oxygen as much  Eating better  Has more energy    Summary of hospitalization:  [facility] discharge summary reviewed  Diagnostic Tests/Treatments reviewed.  Follow up needed: PFT's, pulmonology consult, and echocardiogram   Other Healthcare Providers Involved in Patient s Care:         None  Update since discharge: improved. Feeling much better     Plan of care communicated with patient       Review of Systems   Constitutional, HEENT, cardiovascular, pulmonary, GI, , musculoskeletal, neuro, skin, endocrine and psych systems are negative, except as otherwise noted.      Objective    BP (!) 152/80   Pulse 86   Temp 98.6  F (37  C) (Tympanic)   Resp 16   Wt 108.6 kg (239 lb 7 oz)   SpO2 96%   BMI 43.79 kg/m    Body mass index is 43.79 kg/m .  Physical Exam   GENERAL: healthy, alert and no distress  NECK: no adenopathy, no asymmetry, masses, or scars and thyroid normal to palpation  RESP: lungs clear to auscultation - no rales, rhonchi or wheezes  CV: regular rate and rhythm, normal S1 S2, no S3 or S4, no murmur, click or rub, and peripheral pulses strong. +PVC's. +2 peripheral pitting edema to lower legs, ankles, and feet   MS: no gross musculoskeletal defects noted, +2 peripheral pitting edema to lower legs, ankles, and feet   SKIN: no suspicious lesions or rashes  PSYCH: mentation appears normal, affect normal/bright

## 2023-08-02 RX ORDER — LOSARTAN POTASSIUM 50 MG/1
75 TABLET ORAL DAILY
Qty: 90 TABLET | Refills: 0 | COMMUNITY
Start: 2023-08-02 | End: 2023-09-06

## 2023-08-15 ENCOUNTER — OFFICE VISIT (OUTPATIENT)
Dept: FAMILY MEDICINE | Facility: OTHER | Age: 73
End: 2023-08-15
Attending: NURSE PRACTITIONER
Payer: COMMERCIAL

## 2023-08-15 VITALS
BODY MASS INDEX: 43.62 KG/M2 | WEIGHT: 238.5 LBS | OXYGEN SATURATION: 96 % | HEART RATE: 71 BPM | RESPIRATION RATE: 16 BRPM | DIASTOLIC BLOOD PRESSURE: 84 MMHG | SYSTOLIC BLOOD PRESSURE: 132 MMHG

## 2023-08-15 DIAGNOSIS — I10 ESSENTIAL HYPERTENSION: Primary | ICD-10-CM

## 2023-08-15 DIAGNOSIS — R60.0 LOCALIZED EDEMA: ICD-10-CM

## 2023-08-15 LAB
ANION GAP SERPL CALCULATED.3IONS-SCNC: 16 MMOL/L (ref 7–15)
BUN SERPL-MCNC: 17.1 MG/DL (ref 8–23)
CALCIUM SERPL-MCNC: 9.6 MG/DL (ref 8.8–10.2)
CHLORIDE SERPL-SCNC: 101 MMOL/L (ref 98–107)
CREAT SERPL-MCNC: 0.71 MG/DL (ref 0.51–0.95)
DEPRECATED HCO3 PLAS-SCNC: 25 MMOL/L (ref 22–29)
GFR SERPL CREATININE-BSD FRML MDRD: 90 ML/MIN/1.73M2
GLUCOSE SERPL-MCNC: 89 MG/DL (ref 70–99)
POTASSIUM SERPL-SCNC: 4 MMOL/L (ref 3.4–5.3)
SODIUM SERPL-SCNC: 142 MMOL/L (ref 136–145)

## 2023-08-15 PROCEDURE — 80048 BASIC METABOLIC PNL TOTAL CA: CPT | Mod: ZL | Performed by: NURSE PRACTITIONER

## 2023-08-15 PROCEDURE — G0463 HOSPITAL OUTPT CLINIC VISIT: HCPCS

## 2023-08-15 PROCEDURE — 36415 COLL VENOUS BLD VENIPUNCTURE: CPT | Mod: ZL | Performed by: NURSE PRACTITIONER

## 2023-08-15 PROCEDURE — 99213 OFFICE O/P EST LOW 20 MIN: CPT | Performed by: NURSE PRACTITIONER

## 2023-08-15 RX ORDER — FUROSEMIDE 40 MG
40 TABLET ORAL DAILY
Qty: 90 TABLET | Refills: 0 | Status: SHIPPED | OUTPATIENT
Start: 2023-08-15 | End: 2023-11-14

## 2023-08-15 ASSESSMENT — PAIN SCALES - GENERAL: PAINLEVEL: NO PAIN (0)

## 2023-08-15 NOTE — PROGRESS NOTES
Assessment & Plan     (I10) Essential hypertension  (primary encounter diagnosis)  Comment: BP improved. Continue low salt diet. Continue being active   Plan: Basic metabolic panel            (R60.0) Localized edema  Comment: Check labs. RF lasix   Plan: furosemide (LASIX) 40 MG tablet, Basic         metabolic panel            See Patient Instructions    Return in about 3 months (around 11/15/2023) for Fasting physical .    VERNA Galindo CNP  [facility]    Germain Dhillon is a 72 year old, presenting for the following health issues:  Hypertension      HPI     Hypertension Follow-up    Do you check your blood pressure regularly outside of the clinic? No   Are you following a low salt diet? Yes  Are your blood pressures ever more than 140 on the top number (systolic) OR more   than 90 on the bottom number (diastolic), for example 140/90? No  Denies chest pain or dizziness.       Review of Systems   Constitutional, HEENT, cardiovascular, pulmonary, gi and gu systems are negative, except as otherwise noted.      Objective    /84   Pulse 71   Resp 16   Wt 108.2 kg (238 lb 8 oz)   SpO2 96%   BMI 43.62 kg/m    Body mass index is 43.62 kg/m .  Physical Exam   GENERAL: healthy, alert and no distress  NECK: no adenopathy, no asymmetry, masses, or scars and thyroid normal to palpation  RESP: lungs clear to auscultation - no rales, rhonchi or wheezes  CV: regular rate and rhythm, normal S1 S2, no S3 or S4, no murmur, click or rub, +2 edema to bilateral lower extremities and peripheral pulses strong  MS: no gross musculoskeletal defects noted, +2 edema to bilateral lower extremities   SKIN: no suspicious lesions or rashes  PSYCH: mentation appears normal, affect normal/bright

## 2023-08-16 ENCOUNTER — HOSPITAL ENCOUNTER (OUTPATIENT)
Dept: CARDIOLOGY | Facility: HOSPITAL | Age: 73
Discharge: HOME | End: 2023-08-16
Attending: NURSE PRACTITIONER | Admitting: INTERNAL MEDICINE
Payer: COMMERCIAL

## 2023-08-16 DIAGNOSIS — I10 ESSENTIAL HYPERTENSION: ICD-10-CM

## 2023-08-16 DIAGNOSIS — R60.0 LOCALIZED EDEMA: ICD-10-CM

## 2023-08-16 LAB — LVEF ECHO: NORMAL

## 2023-08-16 PROCEDURE — 93306 TTE W/DOPPLER COMPLETE: CPT | Mod: 26 | Performed by: INTERNAL MEDICINE

## 2023-08-16 PROCEDURE — 93306 TTE W/DOPPLER COMPLETE: CPT

## 2023-08-18 ENCOUNTER — TELEPHONE (OUTPATIENT)
Dept: FAMILY MEDICINE | Facility: OTHER | Age: 73
End: 2023-08-18

## 2023-08-18 DIAGNOSIS — R94.30 ELEVATED RIGHT VENTRICULAR END-DIASTOLIC PRESSURE: ICD-10-CM

## 2023-08-18 DIAGNOSIS — J44.1 COPD WITH RESPIRATORY FAILURE, ACUTE (H): Primary | ICD-10-CM

## 2023-08-18 DIAGNOSIS — J96.00 COPD WITH RESPIRATORY FAILURE, ACUTE (H): Primary | ICD-10-CM

## 2023-08-21 DIAGNOSIS — R60.0 LOCALIZED EDEMA: ICD-10-CM

## 2023-08-22 RX ORDER — FUROSEMIDE 20 MG
TABLET ORAL
Qty: 90 TABLET | Refills: 3 | Status: SHIPPED | OUTPATIENT
Start: 2023-08-22 | End: 2023-11-14

## 2023-09-02 ENCOUNTER — DOCUMENTATION ONLY (OUTPATIENT)
Dept: PULMONOLOGY | Facility: OTHER | Age: 73
End: 2023-09-02

## 2023-09-03 NOTE — PROGRESS NOTES
[facility]       Name: Alejo Fields MRN# [id number]   Age: 72 year old YOB: 1950     Stop Bang questionnaire completed with a score of >3 to allow for HST     Have you been told you snore loudly (louder than talking or loud enough to be heard through doors)? YES    Do you often feel tired, fatigued, or sleepy during the daytime? YES    Has anyone observed you stop breathing during your sleep? YES    Do you have or are you being treated for high blood pressure? YES    Is your BMI greater than 35? YES    Is your neck size circumference 16 inches or greater? NO    Are you over 50 years old? YES    Stop Bang Score (# of yes): 7

## 2023-09-03 NOTE — PROGRESS NOTES
SLEEP HISTORY QUESTIONNAIRE    Please describe the main reason for your sleep appointment?  Suggested it    How long has this been a problem? Few years    Have you been diagnosed with a sleep problem in the past? NO    If so, what?     What treatment was recommended?     Have you had a sleep study in the past? NO    If yes, where and when?     Sleep Habits:   Do you read in bed? No  Do you eat in bed? No  Do you watch TV in bed? No  Do you work in bed? No  Do you use a phone or computer in bed? Yes    Is you sleep disturbed by:   Bed partner: No  Children: No  Noise: Yes   Pets: No  Other: bathroom break      On two or more nights per week, do you drink alcohol to help you fall asleep?NO    On two or more nights per week, do you take melatonin to help you fall asleep? NO    On two or more nights per week, do you take over the counter medicine to fall asleep?  NO    Do you take drinks with caffeine (coffee, tea, soda, energy drinks)? YES    Do you have 3 or more caffeine drinks in a day? NO    Do you have caffeine drinks within 6 hours of bedtime? NO    Do you smoke or use tobacco? NO    Do you exercise? YES    Sleep Routine:   Using a 24 Hour Clock    What time do you usually get into bed on workdays? retired    Weekend/non work days? 11pm-12am    What time do you get out of bed on workdays?       Weekend/non work days?6-7am    Do you work the evening or night shift or do your shifts rotate? DOES NOT APPLY    How long does it usually take to fall to sleep? 5mins    How many times do you wake during the night? 1-2    How much time do you feel that you are awake during the entire night? 15mins    How long does it take for you to fall back to sleep after you wake up? 2mins    Why do you think you wake up? restroom    What do you do when you wake up? Go to the bathroom    How much sleep do you think you get on work nights?     How much sleep do you think you get on weekends/non work days? unanswered    How much sleep  do you think you need to feel your best? 6hrs    How many days during a week do you take a nap on average? 0    What is the average length of your naps?     Do you feel better after taking a nap?     If you could chose the best sleep schedule for you, what time would you go to bed? 10-11pm  What time would you get up? 6-7am    Do you read in bed? NO    Do you eat in bed? NO    Do you watch TV in bed? NO    Do you do work in bed? NO    Do you use a computer or phone in bed? YES    Sleep Disruptions?   Leg movements:  Do you ever have restless, crawling, aching or other unusual feelings in your legs? YES    Do you ever wake yourself by kicking your legs during the night? NO    Are the sheets and blankets messed up or tossed about when you get up? YES    Night-time behaviors:   Do you have nightmares or night terrors? NO   How often?     Have you had times when you were sleep walking? NO    Have you been seen doing anything unusual while you sleep at nights? YES  What? Sleep talking  How often? Couple of times a night    Have you ever hurt yourself or someone else while you were sleeping? NO  Please describe:     Do you clench or grind your teeth during the night? NO    Sleep Apnea (pauses in breathing during sleep):  Do you wake with a headache in the morning? NO  How often?     Does your bed partner, family or friends ever say that you snore? YES  How many nights per week do you snore? 7  Can snoring be heard outside the bedroom? YES    Do you ever wake yourself up from snoring, gasping or choking? YES    Have you ever been told that you stop breathing or have pauses in your breathing? YES    Do you wake in the morning with a dry throat or mouth? YES    Do you have trouble breathing through your nose? ?    Do you have problems with heartburn, reflux or a hiatal hernia? NO    Which positions do you usually sleep in? (stomach, back, sides, all) back and sides    Do you use oxygen or any other medical equipment when you  sleep? NO    Do members of your family (related by blood) snore? YES    Have any members of your family been diagnosed with with sleep apnea? YES    Do other members of your family have restless leg? NO    Do other members of your family have sleep walking? NO    Have you ever had an accident, or near accident due to sleepiness while driving? NO    Does your sleepiness affect your work on the job or at school? NO    Do you ever fall asleep by accident while doing a task? YES    Have you had sudden muscle weakness when laughing, angry or surprised? NO    Have you ever been unable to move your body when falling asleep or waking up? NO    Do you ever have trouble  your dreams from real life events? NO  Please describe:     Physical Health: (including illness and injury): During the past 30 days, on how many days was your physical health not good? 5-10/30 days     Mental Health: (including stress, depression, and problems with emotions): During the last 30 days, how may days was your mental health not good? unanswered/30 days.     During the past 30 days, on how many days did poor physical or mental health keep you from doing your usual activities? This might be self-care, work, or play? unanswered/30 days.     Social History:   Marital status:     Who lives in your home with you? No one    Mother (alive or dead)? dead If has , from what? CHF  Father (alive or dead)? Dead If has , from what? Muscle disease    Siblings: YES  Have any ? NO  If so, from what?     Currently working? NO  If yes, work:   Former jobs: unanswered     Sleepiness Scale:   Sitting and reading 2   Watching TV 2   Sitting in a public place 1   Riding in a car 1   Lying down to rest in the afternoon 2   Sitting and talking to someone 0   Sitting quietly after a lunch without alcohol 1   In a car, stopping for a few minutes in traffic 0       Surgical History:   Past Surgical History:   Procedure Laterality Date    BIOPSY       breast bx    BIOPSY BREAST Bilateral 1978    benign    COLONOSCOPY N/A 3/24/2017    Procedure: COLONOSCOPY;  Surgeon: Felix Fermin MD;  Location: [facility]    COMBINED COLONOSCOPY WITH ARGON PLASMA COAGULATOR (APC) N/A 9/2/2016    Procedure: COMBINED COLONOSCOPY WITH ARGON PLASMA COAGULATOR (APC);  Surgeon: Felix Fermin MD;  Location: [facility]    LUMPECTOMY BREAST Bilateral 1978    benign       Medical Conditions:   Past Medical History:   Diagnosis Date    Breast mass 1978    benign  bilateral       Medications:   Current Outpatient Medications   Medication Sig    albuterol (PROAIR HFA/PROVENTIL HFA/VENTOLIN HFA) 108 (90 Base) MCG/ACT inhaler Inhale 2 puffs into the lungs    FOLIC ACID PO Take 1 mg by mouth 2 times daily    furosemide (LASIX) 20 MG tablet TAKE 1 TABLET BY MOUTH DAILY AS NEEDED FOR ANKLE SWELLING    furosemide (LASIX) 40 MG tablet Take 1 tablet (40 mg) by mouth daily    ipratropium-albuterol (COMBIVENT RESPIMAT)  MCG/ACT inhaler Inhale 1 puff into the lungs    losartan (COZAAR) 50 MG tablet Take 1.5 tablets (75 mg) by mouth daily    methotrexate 2.5 MG tablet 8 tabs once weekly     No current facility-administered medications for this visit.       Are you currently having any of the following symptoms?   General:   Obvious weight gain or loss unanswered  Fever, chills or sweats NO  Drug allergies:     Eyes:   Changes in vision NO  Blind spots NO  Double vision NO  Other     Ear, Nose and Throat:   Ear pain NO  Sore throat NO  Sinus pain NO  Post-nasal drip NO  Runny nose NO  Bloody nose NO    Heart:   Rapid or irregular heart beat NO  Chest pain or pressure NO  Out of breath when lying down NO  Swelling in feet or legs YES  High blood pressure YES  Heart disease unanswered    Nervous system   Headaches NO  Weakness in arms or legs NO  Numbness in arms of legs NO  Other:     Skin  Rashes NO  New moles or skin changes NO  Other     Lungs  Shortness of breath at rest unanswered  Shortness of  breath with activity unanswered  Dry cough NO  Coughing up mucous or phlegm NO  Coughing up blood NO  Wheezing when breathing NO    Lymph System  Swollen lymph nodes NO  New lumps or bumps NO  Changes in breasts or discharge NO    Digestive System   Nausea or vomiting NO  Loose or watery stools NO  Hard, dry stools (constipation) NO  Fat or grease in stools NO  Blood in stools NO  Stools are black or bloody NO  Abdominal (belly) pain NO    Urinary Tract   Pain when you urinate (pee) NO  Blood in your urine NO  Urinate (pee) more than normal NO  Irregular periods NO    Muscles and bones   Muscle pain unanswered  Joint or bone pain unanswered  Swollen joints NO  Other     Glands  Increased thirst or urination NO  Diabetes NO  Morning glucose:   Afternoon glucose:     Mental Health  Depression NO  Anxiety NO  Other mental health issues:

## 2023-09-05 DIAGNOSIS — I10 ESSENTIAL HYPERTENSION: ICD-10-CM

## 2023-09-05 NOTE — TELEPHONE ENCOUNTER
Losartan      Last Written Prescription Date:  8/2/23  Last Fill Quantity: 90,   # refills: 0  Last Office Visit: 8/15/23  Future Office visit:    Next 5 appointments (look out 90 days)      Nov 14, 2023  9:15 AM  (Arrive by 9:00 AM)  PHYSICAL with VERNA Galindo CNP  [facility] ([facility] ) [address]  [facility] [address]  [phone]             Routing refill request to provider for review/approval because:

## 2023-09-05 NOTE — PROGRESS NOTES
"Chart review prior to sleep testing.    Patient Summary:  72 year old yo female who is referred for sleep-disordered breathing, respiratory failure.    Patient Active Problem List    Diagnosis Date Noted    COPD with respiratory failure, acute (H) 07/19/2023     Priority: Medium    Chronic pain of right knee 02/14/2023     Priority: Medium    Primary osteoarthritis involving multiple joints 10/14/2022     Priority: Medium    RA (rheumatoid arthritis) (H) 12/14/2021     Priority: Medium    High risk medication use 09/15/2015     Priority: Medium       Current Outpatient Medications   Medication    albuterol (PROAIR HFA/PROVENTIL HFA/VENTOLIN HFA) 108 (90 Base) MCG/ACT inhaler    FOLIC ACID PO    furosemide (LASIX) 20 MG tablet    furosemide (LASIX) 40 MG tablet    ipratropium-albuterol (COMBIVENT RESPIMAT)  MCG/ACT inhaler    losartan (COZAAR) 50 MG tablet    methotrexate 2.5 MG tablet     No current facility-administered medications for this visit.       Pertinent PMHx of COPD, rheumatoid arthritis, HTN.    Reviewed recent hospitalization at [facility] ~7/19/2023 for COPD exacerbation (acute hypoxemic respiratory failure), new onset CHF.  LVEF 65-70%, diffcult to assess diastolic dysfunction.  Abnormal overnight oximetry that resolved with start of supplemental oxygen at 2 LPM.      I could not find blood gas results for review.    STOP-BANG score of 7, with unknown neck circumference.  [place] score of 9.  BMI of Estimated body mass index is 43.62 kg/m  as calculated from the following:    Height as of 9/27/22: 1.575 m (5' 2\").    Weight as of 8/15/23: 108.2 kg (238 lb 8 oz).     Per questionnaire: \" Suggested it \"    Caffeine use:  No for 3+ per day.  No for within 6 hours of bed.    Tobacco use: No    Sleep pattern:  11pm - [place] to 6-7am, total sleep time ? hours.  Time to fall asleep: ~5 minutes.  Awakenings: 1-2 times per night, 2 minutes to return to sleep, awake for total of 15 minutes per " night.  Napping.  0 days per week, - hours per nap.    Yes for RLS screen.  No for sleep walking.  No for dream enactment behavior.  No for bruxism.    No for morning headaches.  Yes for snoring.  Yes for observed apnea.  Yes for FHx of BATSHEVA.    SHx:  , lives alone.    A/P:    1.)  High likelihood of BATSHEVA with STOP-BANG score of 7.  2.)  Nocturnal hypoxemia currently requiring supplemental oxygen at 2 LPM  3.)  Increased clinical concern for hypoventilation / hypercapnia    Recommend in-lab PSG with TCM and pre-study VBG.    ---  This note was written with the assistance of the Dragon voice-dictation technology software. The final document, although reviewed, may contain errors. For corrections, please contact the office.    Geovanny Novoa MD    Sleep Medicine  [place], [place]  Main Office: [phone]  [facility] [place], [place]  [address] [facility], [address]  Schedule visits: [phone]  Main Office: [phone]  Fax: [phone]

## 2023-09-06 DIAGNOSIS — R06.81 APNEA: Primary | ICD-10-CM

## 2023-09-06 RX ORDER — LOSARTAN POTASSIUM 50 MG/1
50 TABLET ORAL DAILY
Qty: 90 TABLET | Refills: 3 | Status: SHIPPED | OUTPATIENT
Start: 2023-09-06 | End: 2024-08-15

## 2023-09-19 ENCOUNTER — HOSPITAL ENCOUNTER (OUTPATIENT)
Dept: RESPIRATORY THERAPY | Facility: HOSPITAL | Age: 73
Discharge: HOME | End: 2023-09-19
Attending: NURSE PRACTITIONER | Admitting: INTERNAL MEDICINE
Payer: COMMERCIAL

## 2023-09-19 DIAGNOSIS — J96.00 COPD WITH RESPIRATORY FAILURE, ACUTE (H): Primary | ICD-10-CM

## 2023-09-19 DIAGNOSIS — J44.1 COPD WITH RESPIRATORY FAILURE, ACUTE (H): Primary | ICD-10-CM

## 2023-09-19 LAB — HGB BLD-MCNC: 12 G/DL (ref 11.7–15.7)

## 2023-09-19 PROCEDURE — 94729 DIFFUSING CAPACITY: CPT | Mod: 26 | Performed by: INTERNAL MEDICINE

## 2023-09-19 PROCEDURE — 94729 DIFFUSING CAPACITY: CPT

## 2023-09-19 PROCEDURE — 94726 PLETHYSMOGRAPHY LUNG VOLUMES: CPT | Mod: 26 | Performed by: INTERNAL MEDICINE

## 2023-09-19 PROCEDURE — 85018 HEMOGLOBIN: CPT | Performed by: NURSE PRACTITIONER

## 2023-09-19 PROCEDURE — 94010 BREATHING CAPACITY TEST: CPT

## 2023-09-19 PROCEDURE — 94726 PLETHYSMOGRAPHY LUNG VOLUMES: CPT

## 2023-09-19 PROCEDURE — 36415 COLL VENOUS BLD VENIPUNCTURE: CPT | Performed by: NURSE PRACTITIONER

## 2023-09-19 PROCEDURE — 94010 BREATHING CAPACITY TEST: CPT | Mod: 26 | Performed by: INTERNAL MEDICINE

## 2023-09-19 NOTE — PROGRESS NOTES
"Alejo Fields is a 73 year old female who is being evaluated via a billable telephone visit.       What phone number would you like to be contacted at?PHONE@ [phone]  Home Phone [phone]   Work Phone Not on file.   Mobile [phone]       How would you like to obtain your AVS? Tobias       Telephone Virtual Visit Details     Type of service:  Telephone Virtual Visit     Originating Location (pt. Location): Home     Distant Location (provider location):  Off-site, [facility] [place]      Start Time:  0930  End Time:  0950    Virtual visit for: BATSHEVA evaluation & overnight hypoxemia.     A/P:     1.)  High likelihood of BATSHEVA with STOP-BANG score of 7.  2.)  Nocturnal hypoxemia currently requiring supplemental oxygen at 2 LPM  3.)  Increased clinical concern for hypoventilation / hypercapnia   - Recommend in-lab PSG with TCM and pre-study VBG, scheduled  - Consider initiating CPAP +/- supplemental oxygen pending study results    SUBJECTIVE:  Alejo Fields is a 73 year old female.    73 year old yo female who is referred for sleep-disordered breathing, respiratory failure.     Pertinent PMHx of COPD, rheumatoid arthritis, HTN.     Reviewed recent hospitalization at [facility] ~7/19/2023 for COPD exacerbation (acute hypoxemic respiratory failure), new onset CHF.  LVEF 65-70%, diffcult to assess diastolic dysfunction.  Abnormal overnight oximetry that resolved with start of supplemental oxygen at 2 LPM.       I could not find blood gas results for review.     STOP-BANG score of 7, with unknown neck circumference.  [place] score of 9.  BMI of Estimated body mass index is 43.62 kg/m  as calculated from the following:    Height as of 9/27/22: 1.575 m (5' 2\").    Weight as of 8/15/23: 108.2 kg (238 lb 8 oz).      Today - Patient reports that following her hospitalization in July, she was discharged with home oxygen which she used for ~2 weeks. During this time, she used it both during the day " "and overnight, and reports her SpO2 was 93-98% while self-monitoring with a consumer pulse-ox monitor. She found that she was able to maintain a similar saturation without using the supplemental oxygen, so she chose to discontinue it and has not used it since. She reports that her sleep quality was potentially somewhat improved while using the oxygen overnight. She endorses some daytime fatigue/sleepiness, and will often fall asleep while watching television. She has never fallen asleep while driving or while performing other activities. She reports a long history of snoring and has had witness breathing pauses while sleeping. Overall, she has good sleep quality, and typically is able to fall asleep quickly, awakening on average once overnight. We discussed performing an in-lab PSG based on her risk for hypoxemia/hypercapnia and possible concomitant BATSHEVA, and she was in agreement.    Per questionnaire: \" Suggested it \"     Caffeine use:  No for 3+ per day.  No for within 6 hours of bed.     Tobacco use: No     Sleep pattern:  11pm - [place] to 6-7am, total sleep time ? hours.  Time to fall asleep: ~5 minutes.  Awakenings: 1-2 times per night, 2 minutes to return to sleep, awake for total of 15 minutes per night.  Napping.  0 days per week, - hours per nap.     Yes for RLS screen.  No for sleep walking.  No for dream enactment behavior.  No for bruxism.     No for morning headaches.  Yes for snoring.  Yes for observed apnea.  Yes for FHx of BATSHEVA.     SHx:  , lives alone.      Past medical history:    Patient Active Problem List    Diagnosis Date Noted    COPD with respiratory failure, acute (H) 07/19/2023     Priority: Medium    Chronic pain of right knee 02/14/2023     Priority: Medium    Primary osteoarthritis involving multiple joints 10/14/2022     Priority: Medium    RA (rheumatoid arthritis) (H) 12/14/2021     Priority: Medium    High risk medication use 09/15/2015     Priority: Medium       10 point ROS of " systems including Constitutional, Eyes, Respiratory, Cardiovascular, Gastroenterology, Genitourinary, Integumentary, Muscularskeletal, Psychiatric were all negative except for pertinent positives noted in my HPI.    Current Outpatient Medications   Medication Sig Dispense Refill    albuterol (PROAIR HFA/PROVENTIL HFA/VENTOLIN HFA) 108 (90 Base) MCG/ACT inhaler Inhale 2 puffs into the lungs      FOLIC ACID PO Take 1 mg by mouth 2 times daily      furosemide (LASIX) 20 MG tablet TAKE 1 TABLET BY MOUTH DAILY AS NEEDED FOR ANKLE SWELLING 90 tablet 3    furosemide (LASIX) 40 MG tablet Take 1 tablet (40 mg) by mouth daily 90 tablet 0    ipratropium-albuterol (COMBIVENT RESPIMAT)  MCG/ACT inhaler Inhale 1 puff into the lungs      losartan (COZAAR) 50 MG tablet TAKE 1 TABLET BY MOUTH ONCE DAILY. 90 tablet 3    methotrexate 2.5 MG tablet 8 tabs once weekly 32 tablet 0       OBJECTIVE:  There were no vitals taken for this visit.    Physical Exam:  healthy, alert, and no distress  PSYCH: Alert and oriented times 3; coherent speech, normal   rate and volume, able to articulate logical thoughts, able   to abstract reason, no tangential thoughts, no hallucinations   or delusions  His affect is normal  RESP: No cough, no audible wheezing, able to talk in full sentences  Remainder of exam unable to be completed due to telephone visits    ---  This note was written with the assistance of the Dragon voice-dictation technology software. The final document, although reviewed, may contain errors. For corrections, please contact the office.    Total time spent preparing to see the patient, review of chart, obtaining history and physical examination, review of sleep testing, review of treatment options, education, discussion with patient and documenting in Epic / EMR was 25 minutes.  All time involved was spent on the day of service for the patient (the same day as the patient's appointment).    Geovanny Novoa MD    Sleep  [facility]  - [place], [place]  Main Office: [phone]  [place] [facility] - [address], [address]  Schedule visits: [phone]  Main Office: [phone]  Fax: [phone]

## 2023-09-21 ENCOUNTER — TELEPHONE (OUTPATIENT)
Dept: FAMILY MEDICINE | Facility: OTHER | Age: 73
End: 2023-09-21

## 2023-09-21 ENCOUNTER — VIRTUAL VISIT (OUTPATIENT)
Dept: PULMONOLOGY | Facility: OTHER | Age: 73
End: 2023-09-21
Attending: FAMILY MEDICINE
Payer: COMMERCIAL

## 2023-09-21 DIAGNOSIS — J96.00 COPD WITH RESPIRATORY FAILURE, ACUTE (H): Primary | ICD-10-CM

## 2023-09-21 DIAGNOSIS — J44.1 COPD WITH RESPIRATORY FAILURE, ACUTE (H): Primary | ICD-10-CM

## 2023-09-21 PROCEDURE — 99443 PR PHYSICIAN TELEPHONE EVALUATION 21-30 MIN: CPT | Mod: 95 | Performed by: FAMILY MEDICINE

## 2023-09-21 NOTE — NURSING NOTE
Is the patient currently in the state of [place]? YES    Visit mode:TELEPHONE    If the visit is dropped, the patient can be reconnected by: TELEPHONE VISIT: Phone number: [phone]    Will anyone else be joining the visit? NO  (If patient encounters technical issues they should call [phone]    How would you like to obtain your AVS? MyChart    Are changes needed to the allergy or medication list? No    Reason for visit: Consult    Sue MELENDEZ    Has patient had flu shot for current/most recent flu season? If so, when? No

## 2023-09-21 NOTE — TELEPHONE ENCOUNTER
1:12 PM    Reason for Call: Phone Call    Description: Alejo does not need her oxygen anymore and needs a note sent to [facility] to cancel.      Was an appointment offered for this call? No  If yes : Appointment type              Date    Preferred method for responding to this message: Telephone Call  What is your phone number ?  [phone]     If we cannot reach you directly, may we leave a detailed response at the number you provided? No    Can this message wait until your PCP/provider returns, if available today? Not applicable    Nanci Escamilla

## 2023-09-21 NOTE — LETTER
September 21, 2023      Alejo Fields  [address]  [place] [address]        To Whom It May Concern,     RE: Alejo Dhillon does not need her oxygen anymore please cancel her order.           Sincerely,        VERNA Galindo CNP

## 2023-09-22 NOTE — TELEPHONE ENCOUNTER
[facility]    Hospitalist Progress Note    Date of Service (when I saw the patient): 01/15/2017    Assessment and Plan  Adiel Rosa Jr is a 80 year old male who was admitted on 1/11/2017 with fall and was found to have FS in 30s. He continues to have low FS adrien middle of night and higher reading pre-dinner. I will stop nighttime lantus to avoid hypoglycemia and give it in the morning.  Has right foot abscess and osteo. Seen by Dr Zamora and he refused surgery to him.   He adamantly refuses surgery and wants to go the AL where his wife was just admitted to be with her.  Discussed with SW.       DM / hypoglycemia  Suspect due to poor PO intake.  Again was hypo last night - FS was 58 mg. I will stop night time lantus and give it in the morning. He states at home he eats all night so I think dietary changes here have contributed to hypoglycemia.  His lantus adjustments will be work in progress.  A1c is 8.4.      Right Diabetic foot ulcer / abscess with osteomyelitis:    Foot Cx growth: staph pseudintermedius and proteus mirabilis.  Cont IV unasyn. Surgery on Monday.  CATIA were performed and ok at 1 on both legs.    Afib:   Rate controled.  Holding warfarin for now in anticipation of surgery.    CKD  Cr at baseline    CAD / s/p stent 1999   stable at this time.  Cont metoprolol.   Last Echo done in 2014 showed Normal LV size with mild reduction of EF.  Severe LAE,  No major valve issues.        Protein energy malnutrition  Encourage PO intake and give nutritional supplement.    Social Issues:    Has a long history of noncompliance and  vulnerable adult issues with his wife also.  SS has been involved and they are resistant to any help. Currently his wife is in assisted living  She fel at home and unable to care for herself.  Their 3 dogs are now in a shelter  So he has agreed to stay here and proceed with surgery and care.    DVT Prophylaxis: start Heparin SQ since INR is subtherapeutic.    Code  Pt was notified that Carmen agrees she can discontinue oxygen and she can call them to cancel it.    Status: Full Code    Disposition: Expected discharge in 1-2 days           Britt Braden History  Has no complaints today.    admant not to have surgery done and wants to go to AL facility. He states that his brother lost his life after getting this type of surgery.  I explained that with his active infection he will become septic and die.  Also explained that AL facility may not accept due to active infection.    He stated 'get the fucking hell out of here'.       -Data reviewed today: I reviewed all new labs and imaging results over the last 24 hours. I personally reviewed no images or EKG's today.    Physical Exam  Temp: 98  F (36.7  C) Temp src: Tympanic BP: 96/56 mmHg   Heart Rate: 83 Resp: 16 SpO2: 97 % O2 Device: None (Room air)    Filed Vitals:    01/11/17 1004   Weight: 84.3 kg (185 lb 13.6 oz)     Vital Signs with Ranges  Temp:  [97  F (36.1  C)-100.2  F (37.9  C)] 98  F (36.7  C)  Heart Rate:  [83-93] 83  Resp:  [16-18] 16  BP: ()/(56-82) 96/56 mmHg  SpO2:  [95 %-97 %] 97 %  I/O last 3 completed shifts:  In: 1168 [P.O.:720; I.V.:448]  Out: 1675 [Urine:1675]    Peripheral IV 01/14/17 Left Hand (Active)   Site Assessment WDL 1/15/2017  8:40 AM   Line Status Infusing 1/15/2017  8:40 AM   Phlebitis Scale 0-->no symptoms 1/15/2017  8:40 AM   Infiltration Scale 0 1/15/2017  8:40 AM   Extravasation? No 1/14/2017  4:00 PM   Number of days:1       Wound 01/12/17 Right Foot Other (comment) deep wound to middle top of right foot and bottom of middle right foot. (Active)   Site Assessment Pink;Red;Moist;White 1/15/2017  2:00 PM   Miguelina-wound Assessment Erythema 1/15/2017  2:00 PM   Drainage Amount Moderate 1/15/2017  2:00 PM   Drainage Color/Charcteristics Yellow;Tan;Creamy 1/15/2017  2:00 PM   Wound Care/Cleansing Soap and water 1/15/2017  2:00 PM   Dressing Xeroform;Dry gauze 1/15/2017  2:00 PM   Dressing Status New dressing 1/15/2017  2:00 PM   Number of days:3     Line/device assessment(s)  completed for medical necessity    Constitutional: NAD  Alert  Respiratory: CTA b/l  Cardiovascular: S1S2 RRR  GI: soft and nontender  Skin/Integumen: dressing over right foot is c/d/i  Other:      Medications    IV infusion builder WITH additives 75 mL/hr at 01/15/17 0939     Warfarin Therapy Reminder         [START ON 1/16/2017] insulin glargine U-300  35 Units Subcutaneous QAM     metoprolol  25 mg Oral Daily     heparin  5,000 Units Subcutaneous Q8H     sodium chloride (PF)  3 mL Intracatheter Q8H     ampicillin-sulbactam (UNASYN) IV  3 g Intravenous Q6H     aspirin  325 mg Oral Daily     digoxin  125 mcg Oral Daily     insulin aspart  1-7 Units Subcutaneous TID AC     insulin aspart  1-5 Units Subcutaneous At Bedtime     ranitidine  150 mg Oral At Bedtime       Data    Recent Labs  Lab 01/15/17  0613 01/14/17  0611 01/13/17  0548   WBC 8.2 10.7 14.1*   HGB 11.4* 11.8* 12.9*   MCV 88 87 86    326 385   INR 1.40* 1.46* 1.34*    139 140   POTASSIUM 4.4 4.3 3.8   CHLORIDE 104 104 104   CO2 29 28 28   BUN 21 18 18   CR 1.31* 1.24 1.25   ANIONGAP 7 7 8   AZRA 8.2* 8.2* 8.7   * 75 35*   ALBUMIN 2.1* 2.1*  --    PROTTOTAL 6.2* 6.5*  --    BILITOTAL 0.4 0.4  --    ALKPHOS 71 74  --    ALT 11 11  --    AST 9 11  --        No results found for this or any previous visit (from the past 24 hour(s)).

## 2023-09-22 NOTE — TELEPHONE ENCOUNTER
Galina from [facility] called and states they need an order faxed to [facility] to discontinue Alejo's oxygen. She gave her phone number in case there are any further questions [phone]

## 2023-09-26 DIAGNOSIS — J96.00 COPD WITH RESPIRATORY FAILURE, ACUTE (H): Primary | ICD-10-CM

## 2023-09-26 DIAGNOSIS — R06.81 APNEA: ICD-10-CM

## 2023-09-26 DIAGNOSIS — J44.1 COPD WITH RESPIRATORY FAILURE, ACUTE (H): Primary | ICD-10-CM

## 2023-10-02 ENCOUNTER — THERAPY VISIT (OUTPATIENT)
Dept: SLEEP MEDICINE | Facility: HOSPITAL | Age: 73
End: 2023-10-02
Attending: FAMILY MEDICINE
Payer: COMMERCIAL

## 2023-10-02 DIAGNOSIS — J44.1 COPD WITH RESPIRATORY FAILURE, ACUTE (H): ICD-10-CM

## 2023-10-02 DIAGNOSIS — J96.00 COPD WITH RESPIRATORY FAILURE, ACUTE (H): ICD-10-CM

## 2023-10-02 DIAGNOSIS — R06.81 APNEA: ICD-10-CM

## 2023-10-02 LAB
BASE EXCESS BLDV CALC-SCNC: 0.1 MMOL/L (ref -7.7–1.9)
HCO3 BLDV-SCNC: 25 MMOL/L (ref 21–28)
O2/TOTAL GAS SETTING VFR VENT: 21 %
OXYHGB MFR BLDV: 90 % (ref 70–75)
PCO2 BLDV: 41 MM HG (ref 40–50)
PH BLDV: 7.4 [PH] (ref 7.32–7.43)
PO2 BLDV: 64 MM HG (ref 25–47)

## 2023-10-02 PROCEDURE — 82805 BLOOD GASES W/O2 SATURATION: CPT

## 2023-10-02 PROCEDURE — 95811 POLYSOM 6/>YRS CPAP 4/> PARM: CPT

## 2023-10-02 PROCEDURE — 36415 COLL VENOUS BLD VENIPUNCTURE: CPT

## 2023-10-02 PROCEDURE — 95811 POLYSOM 6/>YRS CPAP 4/> PARM: CPT | Mod: 26 | Performed by: FAMILY MEDICINE

## 2023-10-05 NOTE — PROCEDURES
"   SLEEP STUDY INTERPRETATION  SPLIT NIGHT STUDY      Patient: RODGER ANDERSEN  YOB: 1950  Study Date: 10/2/2023  MRN: [id number]  Referring Provider: Carmen Mallory MD  Ordering Provider: Geovanny Novoa MD    Indications for Polysomnography: The patient is a 73 year old Female who is 5' 2\" and weighs 238.0 lbs. Her BMI is 43.8, [place] sleepiness scale 9 and neck circumference is - cm. Relevant medical history includes COPD, rheumatoid arthritis, HTN, hypoxic respiratory failure. A diagnostic polysomnogram was performed to evaluate for sleep apnea/hypoventilation/hypoxemia. After 131.0 minutes of sleep time the patient exhibited sufficient respiratory events qualifying her for a CPAP trial which was then initiated.    Polysomnogram Data: A full night polysomnogram recorded the standard physiologic parameters including EEG, EOG, EMG, ECG, nasal and oral airflow. Respiratory parameters of chest and abdominal movements were recorded with respiratory inductance plethysmography. Oxygen saturation was recorded by pulse oximetry.  Hypopnea scoring rule used: 1B 4%    Diagnostic PSG  Sleep Architecture: Decreases sleep latency, no clear N3 or REM observed, highly increased arousal index.  The total recording time of the polysomnogram was 167.3 minutes. The total sleep time was 131.0 minutes. Sleep latency was decreased at 9.3 minutes without the use of a sleep aid. REM latency was - minutes. Arousal index was increased at 203.8 arousals per hour. Sleep efficiency was mildly decreased at 78.3%. Wake after sleep onset was 27.0 minutes. The patient spent 5.0% of total sleep time in Stage N1, 95.0% in Stage N2, 0.0% in Stage N3, and 0.0% in REM. Time in REM supine was - minutes.    Respiration: Very severe BATSHEVA (.8) with sleep-associated hypoxemia (SpO2 <= 88% for 18.2 minutes).  TCM not suggestive of hypoventilation and pre-study VBG was WNL (pH 7.4, pCO2 41, HCO3 25).  Potential that severity " under-reported given no REM observed.  Events ? The polysomnogram revealed a presence of 231 obstructive, - central, and - mixed apneas resulting in an apnea index of 105.8 events per hour. There were - obstructive hypopneas and - central hypopneas resulting in an obstructive hypopnea index of - and central hypopnea index of - events per hour. The combined apnea/hypopnea index was 105.8 events per hour (central apnea/hypopnea index was - events per hour).  The REM AHI was - events per hour. The supine AHI was 96.7 events per hour. The RERA index was - events per hour. The RDI was 105.8 events per hour.  Snoring - was reported as loud.  Respiratory rate and pattern - was notable for normal respiratory rate and pattern.  Sustained Sleep Associated Hypoventilation - Transcutaneous carbon dioxide monitoring was used, however significant hypoventilation was not present with a maximum change from 36.3 to 43.5 mmHg and 0 minutes at or greater than 55 mmHg.  Sleep Associated Hypoxemia - (Greater than 5 minutes O2 sat at or below 88%) was present. Baseline oxygen saturation was 92.1%. Lowest oxygen saturation was 80.0%. Time spent less than or equal to 88% was 18.2 minutes. Time spent less than or equal to 89% was 26.1 minutes.     Treatment PSG  Sleep Architecture: Impressive improvement in sleep consolidation and REM rebound, seen to have normalization of arousal index.  No supine REM observed.  At 11:14:45 PM the patient was placed on PAP treatment and was titrated at pressures ranging from CPAP 5 cmH2O up to CPAP 10 O2:2 cmH2O. The total recording time of the treatment portion of the study was 370.1 minutes. The total sleep time was 342.0 minutes. During the treatment portion of the study the sleep latency was 12.5 minutes. REM latency was 51.0 minutes. Arousal index was normal at 7.0 arousals per hour. Sleep efficiency was improved at 92.4%. Wake after sleep onset was 15.5 minutes. The patient spent 0.1% of total sleep  time in Stage N1, 58.3% in Stage N2, 5.7% in Stage N3, and 35.8% in REM. Time in REM supine was - minutes.     Respiration: CPAP at 10 cm H2O with supplemental oxygen at 2 LPM appeared very effective in lateral REM / NREM, no supine REM observed during titration.  CPAP at 8 and 10 cm H2O appeared effective for resolving obstructive events.  But with CPAP 8 cm H2O on room air, seen to have sustained hypoxemia in the mid-80's in lateral N2/N3.  Nocturnal supplemental oxygen was started at 2 LPM and was effective in normalizing SpO2. TCM remained unchanged after start of supplemental oxygen.  This titration was considered Adequate (residual AHI with 75% decrease or above constraints without REM?supine sleep at final pressure).    Movement Activity: Nothing of note  Periodic Limb Movements  During the diagnostic portion of the study, there were - PLMs recorded. The PLM index was - movements per hour. The PLM Arousal Index was - per hour.  During the treatment portion of the study, there were - PLMs recorded. The PLM index was - movements per hour. The PLM Arousal Index was - per hour.  REM EMG Activity - Excessive transient/sustained muscle activity was not present.  Nocturnal Behavior - Abnormal sleep related behaviors were not noted during/arising out of NREM / REM sleep.   Bruxism - None apparent.    Cardiac Summary: Overall, appears NSR with frequent PAC's and some sinus arrhythmia  During the diagnostic portion of the study, the average pulse rate was 72.2 bpm. The minimum pulse rate was 39.0 bpm while the maximum pulse rate was 106.0 bpm.    During the treatment portion of the study, the average pulse rate was 68.4 bpm. The minimum pulse rate was 36.0 bpm while the maximum pulse rate was 99.0 bpm.     Assessment:   Very severe BATSHEVA (.8) with sleep-associated hypoxemia (SpO2 <= 88% for 18.2 minutes).  TCM not suggestive of hypoventilation and pre-study VBG was WNL (pH 7.4, pCO2 41, HCO3 25).  Potential that  severity under-reported given no REM observed.  CPAP at 10 cm H2O with supplemental oxygen at 2 LPM appeared very effective in lateral REM / NREM, no supine REM observed during titration.    Recommendations:  Treatment of BATSHEVA with CPAP at 10 cmH2O and nocturnal supplemental oxygen at 1-2 LPM. Recommend clinical follow up with sleep management team, for coaching and review of effectiveness and compliance measures.  Advice regarding the risks of drowsy driving.  Suggest optimizing sleep schedule and avoiding sleep deprivation.  Weight management (if BMI > 30).    Diagnostic Codes:   Obstructive Sleep Apnea G47.33  Sleep Hypoxemia/Hypoventilation G47.36      _____________________________________   Electronically Signed By: Geovanny Novoa MD (10/4/2023)

## 2023-10-12 ENCOUNTER — TELEPHONE (OUTPATIENT)
Dept: PULMONOLOGY | Facility: OTHER | Age: 73
End: 2023-10-12

## 2023-10-17 ENCOUNTER — VIRTUAL VISIT (OUTPATIENT)
Dept: PULMONOLOGY | Facility: OTHER | Age: 73
End: 2023-10-17
Attending: FAMILY MEDICINE
Payer: COMMERCIAL

## 2023-10-17 VITALS — BODY MASS INDEX: 40.97 KG/M2 | WEIGHT: 240 LBS | HEIGHT: 64 IN

## 2023-10-17 DIAGNOSIS — G47.34 NOCTURNAL HYPOXEMIA: ICD-10-CM

## 2023-10-17 DIAGNOSIS — J44.1 COPD WITH RESPIRATORY FAILURE, ACUTE (H): Primary | ICD-10-CM

## 2023-10-17 DIAGNOSIS — G47.33 OSA (OBSTRUCTIVE SLEEP APNEA): ICD-10-CM

## 2023-10-17 DIAGNOSIS — J96.00 COPD WITH RESPIRATORY FAILURE, ACUTE (H): Primary | ICD-10-CM

## 2023-10-17 PROCEDURE — 99443 PR PHYSICIAN TELEPHONE EVALUATION 21-30 MIN: CPT | Mod: 95 | Performed by: FAMILY MEDICINE

## 2023-10-17 ASSESSMENT — PAIN SCALES - GENERAL: PAINLEVEL: NO PAIN (0)

## 2023-10-17 NOTE — NURSING NOTE
Is the patient currently in the state of [place]? YES    Visit mode:TELEPHONE    If the visit is dropped, the patient can be reconnected by: TELEPHONE VISIT: Phone number:   Telephone Information:   Mobile [phone]       Will anyone else be joining the visit? NO  (If patient encounters technical issues they should call [phone])    How would you like to obtain your AVS? MyChart    Are changes needed to the allergy or medication list? No    Reason for visit: RECHECK  Has patient had flu shot for current/most recent flu season? If so, when? No-declines    Marina VASQUEZF

## 2023-10-18 PROBLEM — G47.33 OSA (OBSTRUCTIVE SLEEP APNEA): Status: ACTIVE | Noted: 2023-10-18

## 2023-10-18 PROBLEM — G47.34 NOCTURNAL HYPOXEMIA: Status: ACTIVE | Noted: 2023-10-18

## 2023-11-03 ENCOUNTER — DOCUMENTATION ONLY (OUTPATIENT)
Dept: HOME HEALTH SERVICES | Facility: CLINIC | Age: 73
End: 2023-11-03

## 2023-11-03 NOTE — PROGRESS NOTES
Patient was offered choice of vendor and chose [facility].  Patient Alejo Fields was set up at [employer] [place] on November 3, 2023. Patient received a Resmed Airsense 10 Pressures were set at  10 cm H2O.   Patient s ramp is 5 cm H2O for 20 min and FLEX/EPR is EPR, 2.  Patient received a Resmed Mask name: Airfit N20  Nasal mask size Medium, heated tubing and heated humidifier.  Patient has the following compliance requirements: using and visit requirements  Patient has a follow up on 12/04/2023 with Dr. Novoa.    Aeljo Huff

## 2023-11-10 NOTE — PROGRESS NOTES
"SUBJECTIVE:   Alejo is a 73 year old who presents for Preventive Visit.      Are you in the first 12 months of your Medicare coverage?  No    Healthy Habits:     In general, how would you rate your overall health?  Good    Frequency of exercise:  None    Do you usually eat at least 4 servings of fruit and vegetables a day, include whole grains    & fiber and avoid regularly eating high fat or \"junk\" foods?  Yes    Taking medications regularly:  Yes    Medication side effects:  None    Ability to successfully perform activities of daily living:  No assistance needed    Home Safety:  No safety concerns identified    Hearing Impairment:  No hearing concerns    In the past 6 months, have you been bothered by leaking of urine?  No    In general, how would you rate your overall mental or emotional health?  Good    Additional concerns today:  No      Have you ever done Advance Care Planning? (For example, a Health Directive, POLST, or a discussion with a medical provider or your loved ones about your wishes): Yes, advance care planning is on file.       Fall risk  Fallen 2 or more times in the past year?: No  Any fall with injury in the past year?: No    Cognitive Screening   1) Repeat 3 items (Leader, Season, Table)    2) Clock draw: NORMAL  3) 3 item recall: Recalls 1 object   Results: NORMAL clock, 1-2 items recalled: COGNITIVE IMPAIRMENT LESS LIKELY    Mini-CogTM Copyright HAILEY Whitehead. Licensed by the author for use in [facility]; reprinted with permission (kendell@.[facility]). All rights reserved.      Do you have sleep apnea, excessive snoring or daytime drowsiness? : yes    Reviewed and updated as needed this visit by clinical staff   Tobacco  Allergies  Meds              Reviewed and updated as needed this visit by Provider                 Social History     Tobacco Use     Smoking status: Former     Smokeless tobacco: Never   Substance Use Topics     Alcohol use: Not on file             11/14/2023     " 8:57 AM   Alcohol Use   Prescreen: >3 drinks/day or >7 drinks/week? No     Do you have a current opioid prescription? No  Do you use any other controlled substances or medications that are not prescribed by a provider? None      Current providers sharing in care for this patient include:   Patient Care Team:  Carmen Mallory APRN CNP as PCP - General (Family Medicine)  Carmen Mallory APRN CNP as Assigned PCP  Geovanny Novoa MD as Assigned Sleep Provider    The following health maintenance items are reviewed in Epic and correct as of today:  Health Maintenance   Topic Date Due     COPD ACTION PLAN  Never done     COVID-19 Vaccine (1) Never done     HEPATITIS C SCREENING  Never done     ZOSTER IMMUNIZATION (1 of 2) Never done     LUNG CANCER SCREENING  Never done     RSV VACCINE (Pregnancy & 60+) (1 - 1-dose 60+ series) Never done     MEDICARE ANNUAL WELLNESS VISIT  12/14/2022     INFLUENZA VACCINE (1) Never done     MAMMO SCREENING  01/31/2024     FALL RISK ASSESSMENT  11/14/2024     DTAP/TDAP/TD IMMUNIZATION (2 - Td or Tdap) 04/04/2026     LIPID  12/14/2026     COLORECTAL CANCER SCREENING  04/21/2027     ADVANCE CARE PLANNING  11/14/2028     DEXA  06/11/2033     SPIROMETRY  Completed     PHQ-2 (once per calendar year)  Completed     Pneumococcal Vaccine: 65+ Years  Completed     IPV IMMUNIZATION  Aged Out     HPV IMMUNIZATION  Aged Out     MENINGITIS IMMUNIZATION  Aged Out     RSV MONOCLONAL ANTIBODY  Aged Out         Review of Systems   Cardiovascular:  Positive for peripheral edema.   Breasts:  Negative for tenderness, breast mass and discharge.   Genitourinary:  Negative for pelvic pain, vaginal bleeding and vaginal discharge.   Musculoskeletal:  Positive for arthralgias and joint swelling.   Skin:  Positive for rash.     CONSTITUTIONAL: NEGATIVE for fever, chills, change in weight  INTEGUMENTARY/SKIN: NEGATIVE for worrisome rashes, moles or lesions  EYES: NEGATIVE for vision changes or  "irritation  ENT/MOUTH: NEGATIVE for ear, mouth and throat problems  RESP: NEGATIVE for significant cough or SOB  BREAST: NEGATIVE for masses, tenderness or discharge  CV: NEGATIVE for chest pain, palpitations or peripheral edema  GI: NEGATIVE for nausea, abdominal pain, heartburn, or change in bowel habits  : NEGATIVE for frequency, dysuria, or hematuria  MUSCULOSKELETAL: NEGATIVE for significant arthralgias or myalgia  NEURO: NEGATIVE for weakness, dizziness or paresthesias  ENDOCRINE: NEGATIVE for temperature intolerance, skin/hair changes  HEME: NEGATIVE for bleeding problems  PSYCHIATRIC: NEGATIVE for changes in mood or affect    OBJECTIVE:   /86 (BP Location: Right arm, Patient Position: Sitting, Cuff Size: Adult Regular)   Pulse (!) 43   Temp 98.2  F (36.8  C) (Tympanic)   Wt 112.3 kg (247 lb 8 oz)   SpO2 93%   BMI 42.48 kg/m   Estimated body mass index is 42.48 kg/m  as calculated from the following:    Height as of 10/17/23: 1.626 m (5' 4\").    Weight as of this encounter: 112.3 kg (247 lb 8 oz).  Physical Exam  GENERAL: healthy, alert and no distress  HEAD/SCALP: Patches of baldness throughout scalp. Erythema and scaly lesion to scalp and face. No drainage or warmth to the touch   EYES: Eyes grossly normal to inspection, PERRL and conjunctivae and sclerae normal  HENT: ear canals and TM's normal, nose and mouth without ulcers or lesions  NECK: no adenopathy, no asymmetry, masses, or scars and thyroid normal to palpation  RESP: lungs clear to auscultation - no rales, rhonchi or wheezes  BREAST: Declined exam   CV: regular rate and rhythm, normal S1 S2, no S3 or S4, no murmur, click or rub, +2 peripheral edema and peripheral pulses strong  ABDOMEN: soft, nontender, no hepatosplenomegaly, no masses and bowel sounds normal   (female): Declined exam   MS: no gross musculoskeletal defects noted, +2 edema to bilateral lower extremities   SKIN: no suspicious lesions or rashes  NEURO: Normal strength " and tone, mentation intact and speech normal  PSYCH: mentation appears normal, affect normal/bright    Diagnostic Test Results:  Labs reviewed in Epic    ASSESSMENT / PLAN:     She is unsure when she is due for a colonoscopy. She follows with Dr. Pierce at [facility]. Will need to obtain last colonoscopy report and go from there.     She quit taking her inhalers and declined wanting a refill. She feels she is breathing well. Discussed disease process of COPD with her.       ICD-10-CM    1. Encounter for Medicare annual wellness exam  Z00.00 Lipid Profile (Chol, Trig, HDL, LDL calc)     Comprehensive metabolic panel     CBC with platelets     UA Macroscopic with reflex to Microscopic and Culture     Hepatitis C Screen Reflex to HCV RNA Quant and Genotype     Hepatitis C Screen Reflex to HCV RNA Quant and Genotype     UA Macroscopic with reflex to Microscopic and Culture     CBC with platelets     Comprehensive metabolic panel     Lipid Profile (Chol, Trig, HDL, LDL calc)      2. Healthcare maintenance  Z00.00 Hepatitis C Screen Reflex to HCV RNA Quant and Genotype     Hepatitis C Screen Reflex to HCV RNA Quant and Genotype      3. Essential hypertension  I10 Lipid Profile (Chol, Trig, HDL, LDL calc)     Comprehensive metabolic panel     CBC with platelets     UA Macroscopic with reflex to Microscopic and Culture     UA Macroscopic with reflex to Microscopic and Culture     CBC with platelets     Comprehensive metabolic panel     Lipid Profile (Chol, Trig, HDL, LDL calc)      4. Localized edema  R60.0 furosemide (LASIX) 40 MG tablet      5. Folic acid deficiency  E53.8 folic acid (FOLVITE) 1 MG tablet     Folate     Vitamin B12     Vitamin B12     Folate      6. Irregular heart rhythm  I49.9 EKG 12-lead complete w/read - (Clinic Performed)      7. Hair loss  L65.9 Adult Dermatology  Referral      8. Dermatitis  L30.9 Adult Dermatology  Referral      9. Encounter for screening mammogram for  breast cancer  Z12.31 MA Screen Bilateral w/Ren          COUNSELING:  Reviewed preventive health counseling, as reflected in patient instructions  Special attention given to:       Regular exercise       Healthy diet/nutrition       Vision screening       Fall risk prevention       Immunizations  Declined: Covid-19, Influenza, and Zoster due to Other doesn't want              Osteoporosis prevention/bone health       Consider lung cancer screening for ages 55-80 years (77 for Medicare) and 20 pack-year smoking history-declined screening        Colon cancer screening        She reports that she has quit smoking. She has never used smokeless tobacco.      Appropriate preventive services were discussed with this patient, including applicable screening as appropriate for fall prevention, nutrition, physical activity, Tobacco-use cessation, weight loss and cognition.  Checklist reviewing preventive services available has been given to the patient.    Reviewed patients plan of care and provided an AVS. The Basic Care Plan (routine screening as documented in Health Maintenance) for Alejo meets the Care Plan requirement. This Care Plan has been established and reviewed with the Patient.        VERNA Galindo [facility]    Identified Health Risks:  I have reviewed Opioid Use Disorder and Substance Use Disorder risk factors and made any needed referrals.   She is at risk for lack of exercise and has been provided with information to increase physical activity for the benefit of her well-being.

## 2023-11-10 NOTE — PATIENT INSTRUCTIONS
"Patient Education   Personalized Prevention Plan  You are due for the preventive services outlined below.  Your care team is available to assist you in scheduling these services.  If you have already completed any of these items, please share that information with your care team to update in your medical record.  Health Maintenance Due   Topic Date Due     COPD Action Plan  Never done     Colorectal Cancer Screening  04/21/2018        Patient Education   Personalized Prevention Plan  You are due for the preventive services outlined below.  Your care team is available to assist you in scheduling these services.  If you have already completed any of these items, please share that information with your care team to update in your medical record.  Health Maintenance Due   Topic Date Due     COPD Action Plan  Never done     Colorectal Cancer Screening  04/21/2018     Learning About Being Physically Active  What is physical activity?     Being physically active means doing any kind of activity that gets your body moving.  The types of physical activity that can help you get fit and stay healthy include:  Aerobic or \"cardio\" activities. These make your heart beat faster and make you breathe harder, such as brisk walking, riding a bike, or running. They strengthen your heart and lungs and build up your endurance.  Strength training activities. These make your muscles work against, or \"resist,\" something. Examples include lifting weights or doing push-ups. These activities help tone and strengthen your muscles and bones.  Stretches. These let you move your joints and muscles through their full range of motion. Stretching helps you be more flexible.  Reaching a balance between these three types of physical activity is important because each one contributes to your overall fitness.  What are the benefits of being active?  Being active is one of the best things you can do for your health. It helps you to:  Feel stronger and " "have more energy to do all the things you like to do.  Focus better at school or work.  Feel, think, and sleep better.  Reach and stay at a healthy weight.  Lose fat and build lean muscle.  Lower your risk for serious health problems, including diabetes, heart attack, high blood pressure, and some cancers.  Keep your heart, lungs, bones, muscles, and joints strong and healthy.  How can you make being active part of your life?  Start slowly. Make it your long-term goal to get at least 30 minutes of exercise on most days of the week. Walking is a good choice. You also may want to do other activities, such as running, swimming, cycling, or playing tennis or team sports.  Pick activities that you like--ones that make your heart beat faster, your muscles stronger, and your muscles and joints more flexible. If you find more than one thing you like doing, do them all. You don't have to do the same thing every day.  Get your heart pumping every day. Any activity that makes your heart beat faster and keeps it at that rate for a while counts.  Here are some great ways to get your heart beating faster:  Go for a brisk walk, run, or hike.  Go for a swim or bike ride.  Take an online exercise class or dance.  Play a game of touch football, basketball, or soccer.  Play tennis, pickleball, or racquetball.  Climb stairs.  Even some household chores can be aerobic. Just do them at a faster pace. Raking or mowing the lawn, sweeping the garage, and vacuuming and cleaning your home all can help get your heart rate up.  Strengthen your muscles during the week. You don't have to lift heavy weights or grow big, bulky muscles to get stronger. Doing a few simple activities that make your muscles work against, or \"resist,\" something can help you get stronger. Aim for at least twice a week.  For example, you can:  Do push-ups or sit-ups, which use your own body weight as resistance.  Lift weights or dumbbells or use stretch bands at home or " "in a gym or community center.  Stretch your muscles often. Stretching will help you as you become more active. It can help you stay flexible and loosen tight muscles. It can also help improve your balance and posture and can be a great way to relax.  Be sure to stretch the muscles you'll be using when you work out. It's best to warm your muscles slightly before you stretch them. Walk or do some other light aerobic activity for a few minutes. Then start stretching.  When you stretch your muscles:  Do it slowly. Stretching is not about going fast or making sudden movements.  Don't push or bounce during a stretch.  Hold each stretch for at least 15 to 30 seconds, if you can. You should feel a stretch in the muscle, but not pain.  Breathe out as you do the stretch. Then breathe in as you hold the stretch. Don't hold your breath.  If you're worried about how more activity might affect your health, have a checkup before you start. Follow any special advice your doctor gives you for getting a smart start.  Where can you learn more?  Go to https://www.[employer].net/patiented  Enter W332 in the search box to learn more about \"Learning About Being Physically Active.\"  Current as of: June 6, 2023               Content Version: 13.8    8843-4177 [employer].   Care instructions adapted under license by your healthcare professional. If you have questions about a medical condition or this instruction, always ask your healthcare professional. [employer] disclaims any warranty or liability for your use of this information.         "

## 2023-11-14 ENCOUNTER — OFFICE VISIT (OUTPATIENT)
Dept: FAMILY MEDICINE | Facility: OTHER | Age: 73
End: 2023-11-14
Attending: NURSE PRACTITIONER
Payer: COMMERCIAL

## 2023-11-14 VITALS
WEIGHT: 247.5 LBS | TEMPERATURE: 98.2 F | BODY MASS INDEX: 42.48 KG/M2 | OXYGEN SATURATION: 93 % | HEART RATE: 76 BPM | SYSTOLIC BLOOD PRESSURE: 134 MMHG | DIASTOLIC BLOOD PRESSURE: 86 MMHG

## 2023-11-14 DIAGNOSIS — R60.0 LOCALIZED EDEMA: ICD-10-CM

## 2023-11-14 DIAGNOSIS — L65.9 HAIR LOSS: ICD-10-CM

## 2023-11-14 DIAGNOSIS — L30.9 DERMATITIS: ICD-10-CM

## 2023-11-14 DIAGNOSIS — Z00.00 ENCOUNTER FOR MEDICARE ANNUAL WELLNESS EXAM: Primary | ICD-10-CM

## 2023-11-14 DIAGNOSIS — I10 ESSENTIAL HYPERTENSION: ICD-10-CM

## 2023-11-14 DIAGNOSIS — E53.8 FOLIC ACID DEFICIENCY: ICD-10-CM

## 2023-11-14 DIAGNOSIS — Z12.31 ENCOUNTER FOR SCREENING MAMMOGRAM FOR BREAST CANCER: ICD-10-CM

## 2023-11-14 DIAGNOSIS — I49.9 IRREGULAR HEART RHYTHM: ICD-10-CM

## 2023-11-14 DIAGNOSIS — Z00.00 HEALTHCARE MAINTENANCE: ICD-10-CM

## 2023-11-14 LAB
ALBUMIN SERPL BCG-MCNC: 4.2 G/DL (ref 3.5–5.2)
ALBUMIN UR-MCNC: NEGATIVE MG/DL
ALP SERPL-CCNC: 87 U/L (ref 40–150)
ALT SERPL W P-5'-P-CCNC: 27 U/L (ref 0–50)
ANION GAP SERPL CALCULATED.3IONS-SCNC: 16 MMOL/L (ref 7–15)
APPEARANCE UR: CLEAR
AST SERPL W P-5'-P-CCNC: 20 U/L (ref 0–45)
BILIRUB SERPL-MCNC: 0.4 MG/DL
BILIRUB UR QL STRIP: NEGATIVE
BUN SERPL-MCNC: 15.2 MG/DL (ref 8–23)
CALCIUM SERPL-MCNC: 9.5 MG/DL (ref 8.8–10.2)
CHLORIDE SERPL-SCNC: 101 MMOL/L (ref 98–107)
CHOLEST SERPL-MCNC: 183 MG/DL
COLOR UR AUTO: YELLOW
CREAT SERPL-MCNC: 0.62 MG/DL (ref 0.51–0.95)
DEPRECATED HCO3 PLAS-SCNC: 22 MMOL/L (ref 22–29)
EGFRCR SERPLBLD CKD-EPI 2021: >90 ML/MIN/1.73M2
ERYTHROCYTE [DISTWIDTH] IN BLOOD BY AUTOMATED COUNT: 14.1 % (ref 10–15)
GLUCOSE SERPL-MCNC: 96 MG/DL (ref 70–99)
GLUCOSE UR STRIP-MCNC: NEGATIVE MG/DL
HCT VFR BLD AUTO: 36.8 % (ref 35–47)
HDLC SERPL-MCNC: 49 MG/DL
HGB BLD-MCNC: 12.2 G/DL (ref 11.7–15.7)
HGB UR QL STRIP: NEGATIVE
KETONES UR STRIP-MCNC: NEGATIVE MG/DL
LDLC SERPL CALC-MCNC: 119 MG/DL
LEUKOCYTE ESTERASE UR QL STRIP: NEGATIVE
MCH RBC QN AUTO: 30.4 PG (ref 26.5–33)
MCHC RBC AUTO-ENTMCNC: 33.2 G/DL (ref 31.5–36.5)
MCV RBC AUTO: 92 FL (ref 78–100)
NITRATE UR QL: NEGATIVE
NONHDLC SERPL-MCNC: 134 MG/DL
PH UR STRIP: 6 [PH] (ref 5–7)
PLATELET # BLD AUTO: 295 10E3/UL (ref 150–450)
POTASSIUM SERPL-SCNC: 3.9 MMOL/L (ref 3.4–5.3)
PROT SERPL-MCNC: 7.3 G/DL (ref 6.4–8.3)
RBC # BLD AUTO: 4.01 10E6/UL (ref 3.8–5.2)
SODIUM SERPL-SCNC: 139 MMOL/L (ref 135–145)
SP GR UR STRIP: 1.02 (ref 1–1.03)
TRIGL SERPL-MCNC: 73 MG/DL
UROBILINOGEN UR STRIP-ACNC: 0.2 E.U./DL
WBC # BLD AUTO: 5.7 10E3/UL (ref 4–11)

## 2023-11-14 PROCEDURE — G0439 PPPS, SUBSEQ VISIT: HCPCS | Performed by: NURSE PRACTITIONER

## 2023-11-14 PROCEDURE — 36415 COLL VENOUS BLD VENIPUNCTURE: CPT | Mod: ZL | Performed by: NURSE PRACTITIONER

## 2023-11-14 PROCEDURE — 81003 URINALYSIS AUTO W/O SCOPE: CPT | Mod: ZL | Performed by: NURSE PRACTITIONER

## 2023-11-14 PROCEDURE — 80053 COMPREHEN METABOLIC PANEL: CPT | Mod: ZL | Performed by: NURSE PRACTITIONER

## 2023-11-14 PROCEDURE — 82607 VITAMIN B-12: CPT | Mod: ZL | Performed by: NURSE PRACTITIONER

## 2023-11-14 PROCEDURE — 93005 ELECTROCARDIOGRAM TRACING: CPT | Performed by: NURSE PRACTITIONER

## 2023-11-14 PROCEDURE — 86803 HEPATITIS C AB TEST: CPT | Mod: ZL | Performed by: NURSE PRACTITIONER

## 2023-11-14 PROCEDURE — 82746 ASSAY OF FOLIC ACID SERUM: CPT | Mod: ZL | Performed by: NURSE PRACTITIONER

## 2023-11-14 PROCEDURE — 93010 ELECTROCARDIOGRAM REPORT: CPT | Mod: 77 | Performed by: INTERNAL MEDICINE

## 2023-11-14 PROCEDURE — 85027 COMPLETE CBC AUTOMATED: CPT | Mod: ZL | Performed by: NURSE PRACTITIONER

## 2023-11-14 PROCEDURE — 80061 LIPID PANEL: CPT | Mod: ZL | Performed by: NURSE PRACTITIONER

## 2023-11-14 RX ORDER — FUROSEMIDE 40 MG
40 TABLET ORAL DAILY
Qty: 90 TABLET | Refills: 3 | Status: SHIPPED | OUTPATIENT
Start: 2023-11-14

## 2023-11-14 RX ORDER — FOLIC ACID 1 MG/1
1 TABLET ORAL 2 TIMES DAILY
Qty: 180 TABLET | Refills: 1 | Status: SHIPPED | OUTPATIENT
Start: 2023-11-14 | End: 2023-12-10

## 2023-11-14 ASSESSMENT — ENCOUNTER SYMPTOMS
JOINT SWELLING: 1
ARTHRALGIAS: 1
BREAST MASS: 0

## 2023-11-14 ASSESSMENT — ACTIVITIES OF DAILY LIVING (ADL): CURRENT_FUNCTION: NO ASSISTANCE NEEDED

## 2023-11-14 ASSESSMENT — PAIN SCALES - GENERAL: PAINLEVEL: MODERATE PAIN (5)

## 2023-11-14 NOTE — LETTER
November 20, 2023      Alejo Fields  [address]  [place] [address]        Dear ,    We are writing to inform you of your test results.    Stable labs. Improvement in cholesterol panel. Rest of labs are stable. Continue to work on diet and exercise.     Resulted Orders   Vitamin B12   Result Value Ref Range    Vitamin B12 630 232 - 1,245 pg/mL   Folate   Result Value Ref Range    Folic Acid 39.8 (H) 4.6 - 34.8 ng/mL   Hepatitis C Screen Reflex to HCV RNA Quant and Genotype   Result Value Ref Range    Hepatitis C Antibody Nonreactive Nonreactive    Narrative    Assay performance characteristics have not been established for newborns, infants, and children.   UA Macroscopic with reflex to Microscopic and Culture   Result Value Ref Range    Color Urine Yellow Colorless, Straw, Light Yellow, Yellow    Appearance Urine Clear Clear    Glucose Urine Negative Negative mg/dL    Bilirubin Urine Negative Negative    Ketones Urine Negative Negative mg/dL    Specific Gravity Urine 1.025 1.003 - 1.035    Blood Urine Negative Negative    pH Urine 6.0 5.0 - 7.0    Protein Albumin Urine Negative Negative mg/dL    Urobilinogen Urine 0.2 0.2, 1.0 E.U./dL    Nitrite Urine Negative Negative    Leukocyte Esterase Urine Negative Negative    Narrative    Microscopic not indicated   CBC with platelets   Result Value Ref Range    WBC Count 5.7 4.0 - 11.0 10e3/uL    RBC Count 4.01 3.80 - 5.20 10e6/uL    Hemoglobin 12.2 11.7 - 15.7 g/dL    Hematocrit 36.8 35.0 - 47.0 %    MCV 92 78 - 100 fL    MCH 30.4 26.5 - 33.0 pg    MCHC 33.2 31.5 - 36.5 g/dL    RDW 14.1 10.0 - 15.0 %    Platelet Count 295 150 - 450 10e3/uL   Comprehensive metabolic panel   Result Value Ref Range    Sodium 139 135 - 145 mmol/L      Comment:      Reference intervals for this test were updated on 09/26/2023 to more accurately reflect our healthy population. There may be differences in the flagging of prior results with similar values performed with this method.  Interpretation of those prior results can be made in the context of the updated reference intervals.     Potassium 3.9 3.4 - 5.3 mmol/L    Carbon Dioxide (CO2) 22 22 - 29 mmol/L    Anion Gap 16 (H) 7 - 15 mmol/L    Urea Nitrogen 15.2 8.0 - 23.0 mg/dL    Creatinine 0.62 0.51 - 0.95 mg/dL    GFR Estimate >90 >60 mL/min/1.73m2    Calcium 9.5 8.8 - 10.2 mg/dL    Chloride 101 98 - 107 mmol/L    Glucose 96 70 - 99 mg/dL    Alkaline Phosphatase 87 40 - 150 U/L      Comment:      Reference intervals for this test were updated on 11/14/2023 to more accurately reflect our healthy population. There may be differences in the flagging of prior results with similar values performed with this method. Interpretation of those prior results can be made in the context of the updated reference intervals.    AST 20 0 - 45 U/L      Comment:      Reference intervals for this test were updated on 6/12/2023 to more accurately reflect our healthy population. There may be differences in the flagging of prior results with similar values performed with this method. Interpretation of those prior results can be made in the context of the updated reference intervals.    ALT 27 0 - 50 U/L      Comment:      Reference intervals for this test were updated on 6/12/2023 to more accurately reflect our healthy population. There may be differences in the flagging of prior results with similar values performed with this method. Interpretation of those prior results can be made in the context of the updated reference intervals.      Protein Total 7.3 6.4 - 8.3 g/dL    Albumin 4.2 3.5 - 5.2 g/dL    Bilirubin Total 0.4 <=1.2 mg/dL   Lipid Profile (Chol, Trig, HDL, LDL calc)   Result Value Ref Range    Cholesterol 183 <200 mg/dL    Triglycerides 73 <150 mg/dL    Direct Measure HDL 49 (L) >=50 mg/dL    LDL Cholesterol Calculated 119 (H) <=100 mg/dL    Non HDL Cholesterol 134 (H) <130 mg/dL    Narrative    Cholesterol  Desirable:  <200  mg/dL    Triglycerides  Normal:  Less than 150 mg/dL  Borderline High:  150-199 mg/dL  High:  200-499 mg/dL  Very High:  Greater than or equal to 500 mg/dL    Direct Measure HDL  Female:  Greater than or equal to 50 mg/dL   Male:  Greater than or equal to 40 mg/dL    LDL Cholesterol  Desirable:  <100mg/dL  Above Desirable:  100-129 mg/dL   Borderline High:  130-159 mg/dL   High:  160-189 mg/dL   Very High:  >= 190 mg/dL    Non HDL Cholesterol  Desirable:  130 mg/dL  Above Desirable:  130-159 mg/dL  Borderline High:  160-189 mg/dL  High:  190-219 mg/dL  Very High:  Greater than or equal to 220 mg/dL       If you have any questions or concerns, please call the clinic at the number listed above.       Sincerely,      VERNA Galindo CNP

## 2023-11-14 NOTE — NURSING NOTE
Patient identified using two patient identifiers.  Ear exam showing wax occlusion completed by provider.  Solution: warm water was placed in the right ear(s) via  syringe .  Emma Hightower MA

## 2023-11-15 LAB
FOLATE SERPL-MCNC: 39.8 NG/ML (ref 4.6–34.8)
HCV AB SERPL QL IA: NONREACTIVE
VIT B12 SERPL-MCNC: 630 PG/ML (ref 232–1245)

## 2023-11-18 LAB
ATRIAL RATE - MUSE: 82 BPM
DIASTOLIC BLOOD PRESSURE - MUSE: NORMAL MMHG
INTERPRETATION ECG - MUSE: NORMAL
P AXIS - MUSE: 88 DEGREES
PR INTERVAL - MUSE: 142 MS
QRS DURATION - MUSE: 76 MS
QT - MUSE: 388 MS
QTC - MUSE: 453 MS
R AXIS - MUSE: 37 DEGREES
SYSTOLIC BLOOD PRESSURE - MUSE: NORMAL MMHG
T AXIS - MUSE: 81 DEGREES
VENTRICULAR RATE- MUSE: 82 BPM

## 2023-11-21 ENCOUNTER — TELEPHONE (OUTPATIENT)
Dept: FAMILY MEDICINE | Facility: OTHER | Age: 73
End: 2023-11-21

## 2023-11-21 DIAGNOSIS — Z12.11 SCREENING FOR COLON CANCER: Primary | ICD-10-CM

## 2023-11-21 NOTE — TELEPHONE ENCOUNTER
I called patient and informed her that we received her last colonoscopy report and that she is due for a new one. She verbalized understanding and stated that we can put that order in for her.

## 2023-11-22 ENCOUNTER — TELEPHONE (OUTPATIENT)
Dept: FAMILY MEDICINE | Facility: OTHER | Age: 73
End: 2023-11-22

## 2023-12-01 ENCOUNTER — TELEPHONE (OUTPATIENT)
Dept: FAMILY MEDICINE | Facility: OTHER | Age: 73
End: 2023-12-01

## 2023-12-01 DIAGNOSIS — E53.8 FOLIC ACID DEFICIENCY: ICD-10-CM

## 2023-12-01 NOTE — TELEPHONE ENCOUNTER
Pt states that she thought the strength of folic acid was supposed to be changed?  Cannot find any documentation on this.  Ok to wait until Carmen returns.

## 2023-12-01 NOTE — TELEPHONE ENCOUNTER
10:58 AM    Reason for Call: Phone Call    Description: pt thought her med was suppose to change/the strength but on her bottle it's the same. Rx folic acid/was she suppose to change how many times she takes in a day    Was an appointment offered for this call? No  If yes : Appointment type              Date    Preferred method for responding to this message: Telephone Call  What is your phone number ?[phone]     If we cannot reach you directly, may we leave a detailed response at the number you provided? Yes    Can this message wait until your PCP/provider returns, if available today? Not applicable    Jacki Pritchard

## 2023-12-02 NOTE — PROGRESS NOTES
Alejo Fields is a 73 year old female who is being evaluated via a billable telephone visit.       What phone number would you like to be contacted at?PHONE@ [phone]  Home Phone [phone]   Work Phone Not on file.   Mobile [phone]       How would you like to obtain your AVS? [employer]       Telephone Virtual Visit Details     Type of service:  Telephone Virtual Visit     Originating Location (pt. Location): Home     Distant Location (provider location):  Off-site, [facility] [place]      Start Time:  1pm  End Time:  1:20pm    Virtual visit for CPAP compliance follow-up.     A/P:     1.)  Very severe BATSHEVA (.8) with sleep-associated hypoxemia (SpO2 <= 88% for 18.2 minutes). TCM not suggestive of hypoventilation and pre-study VBG was WNL (pH 7.4, pCO2 41, HCO3 25). Potential that severity under-reported given no REM observed   - History of nocturnal hypoxemia currently requiring supplemental oxygen at 2 LPM    Plan to continue CPAP 10 cm H2O on room air.  We will also plan to check overnight oximetry on CPAP in 1 month and if residual hypoxemia, will restart nocturnal supplemental oxygen at 1-2 L/min.    Otherwise, follow-up in 1 year.    SUBJECTIVE:  Alejo Fields is a 73 year old female.    Pertinent PMHx of COPD, rheumatoid arthritis, HTN.    Prior Sleep Testing:  10/2/2023 - PSG with weight 238 lbs, BMI 43.8.  Very severe BATSHEVA (.8) with sleep-associated hypoxemia (SpO2 <= 88% for 18.2 minutes).  TCM not suggestive of hypoventilation and pre-study VBG was WNL (pH 7.4, pCO2 41, HCO3 25).  Potential that severity under-reported given no REM observed. CPAP at 10 cm H2O with supplemental oxygen at 2 LPM appeared very effective in lateral REM / NREM, no supine REM observed during titration. EKG appears NSR with frequent PAC's and some sinus arrhythmia.     Reviewed recent hospitalization at [facility] ~7/19/2023 for COPD exacerbation (acute hypoxemic respiratory failure), new  onset CHF.  LVEF 65-70%, diffcult to assess diastolic dysfunction.  Abnormal overnight oximetry that resolved with start of supplemental oxygen at 2 LPM.  I could not find blood gas results for review.     9/21/2023 - Patient reports that following her hospitalization in July, she was discharged with home oxygen which she used for ~2 weeks. During this time, she used it both during the day and overnight, and reports her SpO2 was 93-98% while self-monitoring with a consumer pulse-ox monitor. She found that she was able to maintain a similar saturation without using the supplemental oxygen, so she chose to discontinue it and has not used it since. She reports that her sleep quality was potentially somewhat improved while using the oxygen overnight. She endorses some daytime fatigue/sleepiness, and will often fall asleep while watching television. She has never fallen asleep while driving or while performing other activities. She reports a long history of snoring and has had witness breathing pauses while sleeping. Overall, she has good sleep quality, and typically is able to fall asleep quickly, awakening on average once overnight. We discussed performing an in-lab PSG based on her risk for hypoxemia/hypercapnia and possible concomitant BATSHEVA, and she was in agreement.      A/P for in-lab PSG with TCM and pre-study VBG.     10/17/2023 -we reviewed her sleep study results in detail.  Unknown to me, she had discontinued and returned to her supplemental oxygen since our last visit that had been prescribed following her hospitalization with the [facility].    A/P to start CPAP 10 cm H2O on room air.    Today -overall, she feels that the CPAP is working very well.  She notes having much less awakenings, more daytime energy and longer sleep time.    CPAP download reviewed over past 30 days on 10 cm H2O.  Average daily usage 332 minutes, >= 4 hours on 78% of nights.  Leak 95th%ile average ~30-31 L/min.  AHI  2.9.        Past medical history:    Patient Active Problem List    Diagnosis Date Noted    BATSHEVA (obstructive sleep apnea) 10/18/2023     Priority: Medium    Nocturnal hypoxemia 10/18/2023     Priority: Medium    COPD with respiratory failure, acute (H) 07/19/2023     Priority: Medium    Chronic pain of right knee 02/14/2023     Priority: Medium    Primary osteoarthritis involving multiple joints 10/14/2022     Priority: Medium    RA (rheumatoid arthritis) (H) 12/14/2021     Priority: Medium    High risk medication use 09/15/2015     Priority: Medium       10 point ROS of systems including Constitutional, Eyes, Respiratory, Cardiovascular, Gastroenterology, Genitourinary, Integumentary, Muscularskeletal, Psychiatric were all negative except for pertinent positives noted in my HPI.    Current Outpatient Medications   Medication Sig Dispense Refill    folic acid (FOLVITE) 1 MG tablet Take 1 tablet (1 mg) by mouth 2 times daily for 90 days 180 tablet 1    furosemide (LASIX) 40 MG tablet Take 1 tablet (40 mg) by mouth daily 90 tablet 3    losartan (COZAAR) 50 MG tablet TAKE 1 TABLET BY MOUTH ONCE DAILY. 90 tablet 3    methotrexate 2.5 MG tablet 8 tabs once weekly (Patient taking differently: 10 tabs once weekly) 32 tablet 0       OBJECTIVE:  There were no vitals taken for this visit.    Physical Exam:  healthy, alert, and no distress  PSYCH: Alert and oriented times 3; coherent speech, normal   rate and volume, able to articulate logical thoughts, able   to abstract reason, no tangential thoughts, no hallucinations   or delusions  His affect is normal  RESP: No cough, no audible wheezing, able to talk in full sentences  Remainder of exam unable to be completed due to telephone visits    ---  This note was written with the assistance of the Dragon voice-dictation technology software. The final document, although reviewed, may contain errors. For corrections, please contact the office.    Total time spent in conversation  with patient on the phone today was 20 minutes.    Geovanny Novoa MD    Sleep Medicine  [place], [place]  Main Office: [phone]  [facility] - [address], [address]  Schedule visits: [phone]  Main Office: [phone]  Fax: [phone]

## 2023-12-04 ENCOUNTER — VIRTUAL VISIT (OUTPATIENT)
Dept: PULMONOLOGY | Facility: OTHER | Age: 73
End: 2023-12-04
Attending: FAMILY MEDICINE
Payer: COMMERCIAL

## 2023-12-04 VITALS — HEIGHT: 64 IN | WEIGHT: 230 LBS | BODY MASS INDEX: 39.27 KG/M2

## 2023-12-04 DIAGNOSIS — E66.01 CLASS 2 SEVERE OBESITY DUE TO EXCESS CALORIES WITH SERIOUS COMORBIDITY IN ADULT, UNSPECIFIED BMI (H): ICD-10-CM

## 2023-12-04 DIAGNOSIS — E66.812 CLASS 2 SEVERE OBESITY DUE TO EXCESS CALORIES WITH SERIOUS COMORBIDITY IN ADULT, UNSPECIFIED BMI (H): ICD-10-CM

## 2023-12-04 DIAGNOSIS — J44.1 COPD WITH RESPIRATORY FAILURE, ACUTE (H): Primary | ICD-10-CM

## 2023-12-04 DIAGNOSIS — G47.33 OSA (OBSTRUCTIVE SLEEP APNEA): ICD-10-CM

## 2023-12-04 DIAGNOSIS — J96.00 COPD WITH RESPIRATORY FAILURE, ACUTE (H): Primary | ICD-10-CM

## 2023-12-04 DIAGNOSIS — G47.34 NOCTURNAL HYPOXEMIA: ICD-10-CM

## 2023-12-04 PROCEDURE — 99442 PR PHYSICIAN TELEPHONE EVALUATION 11-20 MIN: CPT | Mod: 95 | Performed by: FAMILY MEDICINE

## 2023-12-04 ASSESSMENT — PAIN SCALES - GENERAL: PAINLEVEL: SEVERE PAIN (6)

## 2023-12-04 NOTE — PROGRESS NOTES
Virtual Visit Details    Type of service:  Telephone Visit   Phone call duration: *** minutes     CPAP Compliance Visit:       Name: Alejo Fields MRN# [id number]   Age: 73 year old YOB: 1950     Date of Consultation: December 4, 2023  Primary care provider: Carmen Mallory    Compliance:   83 % of days with >4 hours of use.   0days/30 with no use   Average use: 5hours 53minutes per day   95%ile leak 30.4 L/min   CPAP 100% pressure 10 cm   AHI 2.9 events/hour   YAHIR 0.1  PB 0%     Impression:   Patient is {CPAP Compliance :541972}

## 2023-12-04 NOTE — NURSING NOTE
Has patient had flu shot for current/most recent flu season? If so, when? No      Is the patient currently in the state of [place]? YES    Visit mode:TELEPHONE    If the visit is dropped, the patient can be reconnected by: TELEPHONE VISIT: Phone number:   Telephone Information:   Mobile [phone]       Will anyone else be joining the visit? NO  (If patient encounters technical issues they should call [phone]    How would you like to obtain your AVS? MyChart    Are changes needed to the allergy or medication list? No    Reason for visit: RAE MELENDEZ

## 2023-12-05 ENCOUNTER — TELEPHONE (OUTPATIENT)
Dept: PULMONOLOGY | Facility: OTHER | Age: 73
End: 2023-12-05

## 2023-12-07 ENCOUNTER — TRANSFERRED RECORDS (OUTPATIENT)
Dept: HEALTH INFORMATION MANAGEMENT | Facility: CLINIC | Age: 73
End: 2023-12-07

## 2023-12-10 RX ORDER — FOLIC ACID 1 MG/1
1 TABLET ORAL DAILY
COMMUNITY
Start: 2023-12-10

## 2023-12-11 NOTE — TELEPHONE ENCOUNTER
I called patient and informed her of the information listed below. She verbalized understanding.

## 2023-12-15 ENCOUNTER — TRANSFERRED RECORDS (OUTPATIENT)
Dept: HEALTH INFORMATION MANAGEMENT | Facility: CLINIC | Age: 73
End: 2023-12-15

## 2024-01-04 ENCOUNTER — OFFICE VISIT (OUTPATIENT)
Dept: SLEEP MEDICINE | Facility: HOSPITAL | Age: 74
End: 2024-01-04
Attending: NURSE PRACTITIONER
Payer: COMMERCIAL

## 2024-01-04 DIAGNOSIS — R06.83 SNORING: Primary | ICD-10-CM

## 2024-01-05 ENCOUNTER — DOCUMENTATION ONLY (OUTPATIENT)
Dept: SLEEP MEDICINE | Facility: HOSPITAL | Age: 74
End: 2024-01-05
Attending: FAMILY MEDICINE
Payer: COMMERCIAL

## 2024-01-05 NOTE — PROGRESS NOTES
Patient picked up STACIA at the rehab department. The patient was instructed on how to use the equipment, and displayed understanding. The patient will return the equipment tomorrow 1/5/2024   decreased activity level

## 2024-01-09 ENCOUNTER — MEDICAL CORRESPONDENCE (OUTPATIENT)
Dept: HEALTH INFORMATION MANAGEMENT | Facility: CLINIC | Age: 74
End: 2024-01-09

## 2024-01-09 ENCOUNTER — TRANSFERRED RECORDS (OUTPATIENT)
Dept: HEALTH INFORMATION MANAGEMENT | Facility: CLINIC | Age: 74
End: 2024-01-09

## 2024-01-10 ENCOUNTER — MEDICAL CORRESPONDENCE (OUTPATIENT)
Dept: HEALTH INFORMATION MANAGEMENT | Facility: CLINIC | Age: 74
End: 2024-01-10

## 2024-01-11 ENCOUNTER — TRANSCRIBE ORDERS (OUTPATIENT)
Dept: OTHER | Age: 74
End: 2024-01-11

## 2024-01-11 ENCOUNTER — VIRTUAL VISIT (OUTPATIENT)
Dept: PULMONOLOGY | Facility: OTHER | Age: 74
End: 2024-01-11
Attending: FAMILY MEDICINE
Payer: COMMERCIAL

## 2024-01-11 VITALS — HEIGHT: 64 IN | WEIGHT: 235 LBS | BODY MASS INDEX: 40.12 KG/M2

## 2024-01-11 DIAGNOSIS — E66.01 CLASS 2 SEVERE OBESITY DUE TO EXCESS CALORIES WITH SERIOUS COMORBIDITY IN ADULT, UNSPECIFIED BMI (H): ICD-10-CM

## 2024-01-11 DIAGNOSIS — G47.34 NOCTURNAL HYPOXEMIA: ICD-10-CM

## 2024-01-11 DIAGNOSIS — G47.33 OSA (OBSTRUCTIVE SLEEP APNEA): Primary | ICD-10-CM

## 2024-01-11 DIAGNOSIS — E66.812 CLASS 2 SEVERE OBESITY DUE TO EXCESS CALORIES WITH SERIOUS COMORBIDITY IN ADULT, UNSPECIFIED BMI (H): ICD-10-CM

## 2024-01-11 DIAGNOSIS — L65.9 HAIR LOSS: Primary | ICD-10-CM

## 2024-01-11 PROCEDURE — 99443 PR PHYSICIAN TELEPHONE EVALUATION 21-30 MIN: CPT | Mod: 95 | Performed by: FAMILY MEDICINE

## 2024-01-11 RX ORDER — PREDNISONE 5 MG/1
TABLET ORAL
COMMUNITY
Start: 2024-01-08 | End: 2024-01-24

## 2024-01-11 ASSESSMENT — PAIN SCALES - GENERAL: PAINLEVEL: NO PAIN (0)

## 2024-01-11 NOTE — NURSING NOTE
Is the patient currently in the state of [place]? YES    Visit mode:TELEPHONE    If the visit is dropped, the patient can be reconnected by: TELEPHONE VISIT: Phone number:   Telephone Information:   Mobile [phone]       Will anyone else be joining the visit? NO  (If patient encounters technical issues they should call [phone]    How would you like to obtain your AVS? MyChart    Are changes needed to the allergy or medication list? Pt stated no changes to allergies and Pt stated no med changes    Reason for visit: RECHECK  Has patient had flu shot for current/most recent flu season? If so, when? No    Marina MELENDEZ

## 2024-01-11 NOTE — PROGRESS NOTES
Virtual Visit Details    Type of service:  Telephone Visit   Phone call duration: 15 minutes     Alejo Fields is a 73 year old female who is being evaluated via a billable telephone visit.       What phone number would you like to be contacted at?PHONE@ [phone]  Home Phone [phone]   Work Phone Not on file.   Mobile [phone]       How would you like to obtain your AVS? MyChart       Telephone Virtual Visit Details     Type of service:  Telephone Virtual Visit     Originating Location (pt. Location): Home     Distant Location (provider location):  Off-site, [facility] [place]      Start Time:  1:10pm  End Time:  1:25pm    Virtual visit for review overnight oximetry results.     A/P:     1.)  Very severe BATSHEVA (.8) with sleep-associated hypoxemia (SpO2 <= 88% for 18.2 minutes). TCM not suggestive of hypoventilation and pre-study VBG was WNL (pH 7.4, pCO2 41, HCO3 25). Potential that severity under-reported given no REM observed   - History of nocturnal hypoxemia currently requiring supplemental oxygen at 2 LPM     Overnight oximetry on CPAP 10 cm H2O on room air performed January 4, 2024 did show persistent sleep associated hypoxemia.  Given this, I did recommend restart nocturnal supplemental oxygen at 1-2 L/min bled into CPAP and I would recommend repeat overnight oximetry in 1 month on this combination.    She is very hesitant to restart supplemental oxygen secondary to cost, and given that we do suspect this is related to obesity ventilation, we agreed to start with trial of scheduled weight loss of at least 10-15 pounds over the next 3 months.  And to follow-up in 3 months, if weight is not below this, and if oximetry appears similar on CPAP, I will continue to recommend addition of nocturnal supplemental oxygen.    SUBJECTIVE:  Alejo Fields is a 73 year old female.    Pertinent PMHx of COPD, rheumatoid arthritis, HTN.     Prior Sleep Testing:  10/2/2023 - PSG with  weight 238 lbs, BMI 43.8.  Very severe BATSHEVA (.8) with sleep-associated hypoxemia (SpO2 <= 88% for 18.2 minutes).  TCM not suggestive of hypoventilation and pre-study VBG was WNL (pH 7.4, pCO2 41, HCO3 25).  Potential that severity under-reported given no REM observed. CPAP at 10 cm H2O with supplemental oxygen at 2 LPM appeared very effective in lateral REM / NREM, no supine REM observed during titration. EKG appears NSR with frequent PAC's and some sinus arrhythmia.     Reviewed recent hospitalization at [facility] ~7/19/2023 for COPD exacerbation (acute hypoxemic respiratory failure), new onset CHF.  LVEF 65-70%, diffcult to assess diastolic dysfunction.  Abnormal overnight oximetry that resolved with start of supplemental oxygen at 2 LPM.  I could not find blood gas results for review.     9/21/2023 - Patient reports that following her hospitalization in July, she was discharged with home oxygen which she used for ~2 weeks. During this time, she used it both during the day and overnight, and reports her SpO2 was 93-98% while self-monitoring with a Hyperink pulse-ox monitor. She found that she was able to maintain a similar saturation without using the supplemental oxygen, so she chose to discontinue it and has not used it since. She reports that her sleep quality was potentially somewhat improved while using the oxygen overnight. She endorses some daytime fatigue/sleepiness, and will often fall asleep while watching television. She has never fallen asleep while driving or while performing other activities. She reports a long history of snoring and has had witness breathing pauses while sleeping. Overall, she has good sleep quality, and typically is able to fall asleep quickly, awakening on average once overnight. We discussed performing an in-lab PSG based on her risk for hypoxemia/hypercapnia and possible concomitant BATSHEVA, and she was in agreement.      A/P for in-lab PSG with TCM and pre-study VBG.      10/17/2023 -we reviewed her sleep study results in detail.  Unknown to me, she had discontinued and returned to her supplemental oxygen since our last visit that had been prescribed following her hospitalization with the [employer] system.     A/P to start CPAP 10 cm H2O on room air.     12/4/2023 -overall, she feels that the CPAP is working very well.  She notes having much less awakenings, more daytime energy and longer sleep time.     CPAP download reviewed over past 30 days on 10 cm H2O.  Average daily usage 332 minutes, >= 4 hours on 78% of nights.  Leak 95th%ile average ~30-31 L/min.  AHI 2.9.    A/P to continue CPAP 10 on room air, check STACIA on CPAP and if residual hypoxemia, restart nocturnal supplemental oxygen at 1-2 LPM.    Today -overall, she feels that she is sleeping very well with her CPAP and has no real concerns today.    Documenting results of home overnight pulse oximetry performed night of 1/4/2024 on CPAP 10 cm H2O.    Total recording time 6:14:24 with valid time of 6:14:24.  HR recording appeared to have significant artifact, with average HR ~80 bpm.  Lowest SpO2 81%, mean / baseline SpO2 89.7%.  SpO2 < 88% for 53.1 minutes.  Oxygen desaturation index (4% or more desaturation, lasting 10+ seconds) of 6.9 / hour.    Geovanny Novoa MD        Past medical history:    Patient Active Problem List    Diagnosis Date Noted    Class 2 severe obesity due to excess calories with serious comorbidity in adult (H) 12/04/2023     Priority: Medium    BATSHEVA (obstructive sleep apnea) 10/18/2023     Priority: Medium    Nocturnal hypoxemia 10/18/2023     Priority: Medium    COPD with respiratory failure, acute (H) 07/19/2023     Priority: Medium    Chronic pain of right knee 02/14/2023     Priority: Medium    Primary osteoarthritis involving multiple joints 10/14/2022     Priority: Medium    RA (rheumatoid arthritis) (H) 12/14/2021     Priority: Medium    High risk medication use 09/15/2015     Priority: Medium        10 point ROS of systems including Constitutional, Eyes, Respiratory, Cardiovascular, Gastroenterology, Genitourinary, Integumentary, Muscularskeletal, Psychiatric were all negative except for pertinent positives noted in my HPI.    Current Outpatient Medications   Medication Sig Dispense Refill    folic acid (FOLVITE) 1 MG tablet Take 1 tablet (1 mg) by mouth daily      furosemide (LASIX) 40 MG tablet Take 1 tablet (40 mg) by mouth daily 90 tablet 3    losartan (COZAAR) 50 MG tablet TAKE 1 TABLET BY MOUTH ONCE DAILY. 90 tablet 3    methotrexate 2.5 MG tablet 8 tabs once weekly (Patient taking differently: 10 tabs once weekly) 32 tablet 0       OBJECTIVE:  There were no vitals taken for this visit.    Physical Exam:  healthy, alert, and no distress  PSYCH: Alert and oriented times 3; coherent speech, normal   rate and volume, able to articulate logical thoughts, able   to abstract reason, no tangential thoughts, no hallucinations   or delusions  His affect is normal  RESP: No cough, no audible wheezing, able to talk in full sentences  Remainder of exam unable to be completed due to telephone visits    ---  This note was written with the assistance of the Dragon voice-dictation technology software. The final document, although reviewed, may contain errors. For corrections, please contact the office.    Total time spent in conversation with patient on the phone today was 15 minutes.    Geovanny Novoa MD    Sleep Medicine  [place], [place]  Main Office: [phone]  [place]  [address] [place] [address], [address]  Schedule visits: [phone]  Main Office: [phone]  Fax: [phone]

## 2024-02-20 ENCOUNTER — TELEPHONE (OUTPATIENT)
Dept: MAMMOGRAPHY | Facility: OTHER | Age: 74
End: 2024-02-20

## 2024-02-20 ENCOUNTER — ANCILLARY PROCEDURE (OUTPATIENT)
Dept: MAMMOGRAPHY | Facility: OTHER | Age: 74
End: 2024-02-20
Attending: NURSE PRACTITIONER
Payer: COMMERCIAL

## 2024-02-20 DIAGNOSIS — Z12.31 ENCOUNTER FOR SCREENING MAMMOGRAM FOR BREAST CANCER: ICD-10-CM

## 2024-02-20 PROCEDURE — 77063 BREAST TOMOSYNTHESIS BI: CPT | Mod: TC

## 2024-08-15 DIAGNOSIS — I10 ESSENTIAL HYPERTENSION: ICD-10-CM

## 2024-08-15 RX ORDER — LOSARTAN POTASSIUM 50 MG/1
50 TABLET ORAL DAILY
Qty: 90 TABLET | Refills: 0 | Status: SHIPPED | OUTPATIENT
Start: 2024-08-15

## 2024-08-15 NOTE — TELEPHONE ENCOUNTER
Cozaar       Last Written Prescription Date:  9/06/2023  Last Fill Quantity: 90,   # refills: 3  Last Office Visit: 11/14/2023  Future Office visit:

## 2025-01-10 ENCOUNTER — OFFICE VISIT (OUTPATIENT)
Dept: FAMILY MEDICINE | Facility: OTHER | Age: 75
End: 2025-01-10
Attending: NURSE PRACTITIONER
Payer: COMMERCIAL

## 2025-01-10 VITALS
BODY MASS INDEX: 43.04 KG/M2 | OXYGEN SATURATION: 96 % | HEART RATE: 72 BPM | DIASTOLIC BLOOD PRESSURE: 80 MMHG | WEIGHT: 252.1 LBS | SYSTOLIC BLOOD PRESSURE: 148 MMHG | HEIGHT: 64 IN | TEMPERATURE: 97.5 F

## 2025-01-10 DIAGNOSIS — Z00.00 ENCOUNTER FOR MEDICARE ANNUAL WELLNESS EXAM: Primary | ICD-10-CM

## 2025-01-10 DIAGNOSIS — I10 ESSENTIAL HYPERTENSION: ICD-10-CM

## 2025-01-10 LAB
ALBUMIN SERPL BCG-MCNC: 4.3 G/DL (ref 3.5–5.2)
ALP SERPL-CCNC: 100 U/L (ref 40–150)
ALT SERPL W P-5'-P-CCNC: 21 U/L (ref 0–50)
ANION GAP SERPL CALCULATED.3IONS-SCNC: 14 MMOL/L (ref 7–15)
AST SERPL W P-5'-P-CCNC: 19 U/L (ref 0–45)
BILIRUB SERPL-MCNC: 0.5 MG/DL
BUN SERPL-MCNC: 20.3 MG/DL (ref 8–23)
CALCIUM SERPL-MCNC: 9.4 MG/DL (ref 8.8–10.4)
CHLORIDE SERPL-SCNC: 105 MMOL/L (ref 98–107)
CHOLEST SERPL-MCNC: 186 MG/DL
CREAT SERPL-MCNC: 0.69 MG/DL (ref 0.51–0.95)
EGFRCR SERPLBLD CKD-EPI 2021: >90 ML/MIN/1.73M2
ERYTHROCYTE [DISTWIDTH] IN BLOOD BY AUTOMATED COUNT: 14 % (ref 10–15)
FASTING STATUS PATIENT QL REPORTED: YES
FASTING STATUS PATIENT QL REPORTED: YES
GLUCOSE SERPL-MCNC: 111 MG/DL (ref 70–99)
HCO3 SERPL-SCNC: 21 MMOL/L (ref 22–29)
HCT VFR BLD AUTO: 37.2 % (ref 35–47)
HDLC SERPL-MCNC: 63 MG/DL
HGB BLD-MCNC: 12.6 G/DL (ref 11.7–15.7)
LDLC SERPL CALC-MCNC: 109 MG/DL
MCH RBC QN AUTO: 31.1 PG (ref 26.5–33)
MCHC RBC AUTO-ENTMCNC: 33.9 G/DL (ref 31.5–36.5)
MCV RBC AUTO: 92 FL (ref 78–100)
NONHDLC SERPL-MCNC: 123 MG/DL
PLATELET # BLD AUTO: 292 10E3/UL (ref 150–450)
POTASSIUM SERPL-SCNC: 4.2 MMOL/L (ref 3.4–5.3)
PROT SERPL-MCNC: 7.4 G/DL (ref 6.4–8.3)
RBC # BLD AUTO: 4.05 10E6/UL (ref 3.8–5.2)
SODIUM SERPL-SCNC: 140 MMOL/L (ref 135–145)
TRIGL SERPL-MCNC: 70 MG/DL
TSH SERPL DL<=0.005 MIU/L-ACNC: 1.1 UIU/ML (ref 0.3–4.2)
WBC # BLD AUTO: 6.4 10E3/UL (ref 4–11)

## 2025-01-10 PROCEDURE — 84443 ASSAY THYROID STIM HORMONE: CPT | Mod: ZL | Performed by: NURSE PRACTITIONER

## 2025-01-10 PROCEDURE — 82435 ASSAY OF BLOOD CHLORIDE: CPT | Mod: ZL | Performed by: NURSE PRACTITIONER

## 2025-01-10 PROCEDURE — 84478 ASSAY OF TRIGLYCERIDES: CPT | Mod: ZL | Performed by: NURSE PRACTITIONER

## 2025-01-10 PROCEDURE — 84155 ASSAY OF PROTEIN SERUM: CPT | Mod: ZL | Performed by: NURSE PRACTITIONER

## 2025-01-10 PROCEDURE — 82465 ASSAY BLD/SERUM CHOLESTEROL: CPT | Mod: ZL | Performed by: NURSE PRACTITIONER

## 2025-01-10 PROCEDURE — 85014 HEMATOCRIT: CPT | Mod: ZL | Performed by: NURSE PRACTITIONER

## 2025-01-10 PROCEDURE — 36415 COLL VENOUS BLD VENIPUNCTURE: CPT | Mod: ZL | Performed by: NURSE PRACTITIONER

## 2025-01-10 RX ORDER — TRIAMCINOLONE ACETONIDE 1 MG/G
CREAM TOPICAL PRN
COMMUNITY
Start: 2024-11-19

## 2025-01-10 RX ORDER — CLOBETASOL PROPIONATE 0.5 MG/ML
SOLUTION TOPICAL PRN
COMMUNITY
Start: 2024-11-19

## 2025-01-10 SDOH — HEALTH STABILITY: PHYSICAL HEALTH: ON AVERAGE, HOW MANY DAYS PER WEEK DO YOU ENGAGE IN MODERATE TO STRENUOUS EXERCISE (LIKE A BRISK WALK)?: 3 DAYS

## 2025-01-10 ASSESSMENT — PAIN SCALES - GENERAL: PAINLEVEL_OUTOF10: MODERATE PAIN (5)

## 2025-01-10 ASSESSMENT — SOCIAL DETERMINANTS OF HEALTH (SDOH): HOW OFTEN DO YOU GET TOGETHER WITH FRIENDS OR RELATIVES?: THREE TIMES A WEEK

## 2025-01-10 NOTE — PROGRESS NOTES
"Preventive Care Visit  RANGE GAYLA GonzalesVERNA Smallwood CNP, Family Medicine  Jaime 10, 2025      Assessment & Plan       (Z00.00) Encounter for Medicare annual wellness exam  (primary encounter diagnosis)  Comment: Check labs   Plan: Comprehensive metabolic panel, Lipid Profile,         TSH with free T4 reflex, CBC with platelets            (I10) Essential hypertension  Comment: Check labs   Plan: Comprehensive metabolic panel, Lipid Profile,         CBC with platelets              BMI  Estimated body mass index is 43.27 kg/m  as calculated from the following:    Height as of this encounter: 1.626 m (5' 4\").    Weight as of this encounter: 114.4 kg (252 lb 1.6 oz).   Weight management plan: Discussed healthy diet and exercise guidelines    Counseling  Appropriate preventive services were addressed with this patient via screening, questionnaire, or discussion as appropriate for fall prevention, nutrition, physical activity, Tobacco-use cessation, social engagement, weight loss and cognition.  Checklist reviewing preventive services available has been given to the patient.  Reviewed patient's diet, addressing concerns and/or questions.   She is at risk for lack of exercise and has been provided with information to increase physical activity for the benefit of her well-being.       See Patient Instructions    Return if symptoms worsen or fail to improve.    Germain Dhillon is a 74 year old, presenting for the following:  Physical and Wellness Visit        1/10/2025     9:26 AM   Additional Questions   Roomed by Emma MARADIAGA       Lists of hospitals in the [place]      Health Care Directive  Patient has a Health Care Directive on file  Advance care planning document is on file and is current.          1/10/2025   General Health   How would you rate your overall physical health? (!) FAIR   Feel stress (tense, anxious, or unable to sleep) Only a little   (!) STRESS CONCERN      1/10/2025   Nutrition   Diet: Regular (no restrictions)         " 1/10/2025   Exercise   Days per week of moderate/strenous exercise 3 days         1/10/2025   Social Factors   Frequency of gathering with friends or relatives Three times a week   Worry food won't last until get money to buy more No   Food not last or not have enough money for food? No   Do you have housing? (Housing is defined as stable permanent housing and does not include staying ouside in a car, in a tent, in an abandoned building, in an overnight shelter, or couch-surfing.) Yes   Are you worried about losing your housing? No   Lack of transportation? No   Unable to get utilities (heat,electricity)? No         1/10/2025   Fall Risk   Fallen 2 or more times in the past year? No   Trouble with walking or balance? No          1/10/2025   Activities of Daily Living- Home Safety   Needs help with the following daily activites None of the above   Safety concerns in the home None of the above         1/10/2025   Dental   Dentist two times every year? Yes         1/10/2025   Hearing Screening   Hearing concerns? None of the above         1/10/2025   Driving Risk Screening   Patient/family members have concerns about driving No         1/10/2025   General Alertness/Fatigue Screening   Have you been more tired than usual lately? No         1/10/2025   Urinary Incontinence Screening   Bothered by leaking urine in past 6 months No         1/10/2025   TB Screening   Were you born outside of the [place]? No         Today's PHQ-2 Score:       1/10/2025     9:24 AM   PHQ-2 ( 1999 [employer])   Q1: Little interest or pleasure in doing things 0   Q2: Feeling down, depressed or hopeless 1   PHQ-2 Score 1    Q1: Little interest or pleasure in doing things Not at all   Q2: Feeling down, depressed or hopeless Several days   PHQ-2 Score 1       Patient-reported           1/10/2025   Substance Use   Alcohol more than 3/day or more than 7/wk No   Do you have a current opioid prescription? No   How severe/bad is pain from 1 to 10? 5/10   Do you  use any other substances recreationally? No     Social History     Tobacco Use    Smoking status: Former    Smokeless tobacco: Never   Vaping Use    Vaping status: Never Used           2/20/2024   LAST FHS-7 RESULTS   1st degree relative breast or ovarian cancer No   Any relative bilateral breast cancer No   Any male have breast cancer No   Any ONE woman have BOTH breast AND ovarian cancer No   Any woman with breast cancer before 50yrs No   2 or more relatives with breast AND/OR ovarian cancer No   2 or more relatives with breast AND/OR bowel cancer No       Mammogram Screening - Mammogram every 1-2 years updated in Health Maintenance based on mutual decision making    ASCVD Risk   The 10-year ASCVD risk score (Savi DELUCA, et al., 2019) is: 22.2%    Values used to calculate the score:      Age: 74 years      Sex: Female      Is Non- : No      Diabetic: No      Tobacco smoker: No      Systolic Blood Pressure: 148 mmHg      Is BP treated: Yes      HDL Cholesterol: 49 mg/dL      Total Cholesterol: 183 mg/dL      Current providers sharing in care for this patient include:  Patient Care Team:  Carmen Mallory APRN CNP as PCP - General (Family Medicine)  Carmen Mallory APRN CNP as Assigned PCP  Geovanny Novoa MD as Assigned Sleep Provider  Jessenia Hardy MD (Rheumatology)    The following health maintenance items are reviewed in Epic and correct as of today:  Health Maintenance   Topic Date Due    COPD ACTION PLAN  Never done    COVID-19 Vaccine (1) Never done    ZOSTER IMMUNIZATION (1 of 2) Never done    LUNG CANCER SCREENING  Never done    RSV VACCINE (1 - Risk 60-74 years 1-dose series) Never done    COLORECTAL CANCER SCREENING  09/11/2018    BMP  11/14/2024    INFLUENZA VACCINE (1) 06/30/2025 (Originally 9/1/2024)    MEDICARE ANNUAL WELLNESS VISIT  01/10/2026    FALL RISK ASSESSMENT  01/10/2026    MAMMO SCREENING  02/20/2026    DTAP/TDAP/TD IMMUNIZATION (2 - Td  "or Tdap) 04/04/2026    GLUCOSE  11/14/2026    LIPID  11/14/2028    ADVANCE CARE PLANNING  01/10/2030    DEXA  06/11/2033    SPIROMETRY  Completed    HEPATITIS C SCREENING  Completed    PHQ-2 (once per calendar year)  Completed    Pneumococcal Vaccine: 50+ Years  Completed    HPV IMMUNIZATION  Aged Out    MENINGITIS IMMUNIZATION  Aged Out    RSV MONOCLONAL ANTIBODY  Aged Out         Review of Systems  CONSTITUTIONAL: NEGATIVE for fever, chills, change in weight  INTEGUMENTARY/SKIN: NEGATIVE for worrisome rashes, moles or lesions  EYES: NEGATIVE for vision changes or irritation  ENT/MOUTH: NEGATIVE for ear, mouth and throat problems  RESP: NEGATIVE for significant cough or SOB  BREAST: NEGATIVE for masses, tenderness or discharge  CV: NEGATIVE for chest pain, palpitations or peripheral edema  GI: NEGATIVE for nausea, abdominal pain, heartburn, or change in bowel habits  : NEGATIVE for frequency, dysuria, or hematuria  MUSCULOSKELETAL: NEGATIVE for significant arthralgias or myalgia  NEURO: NEGATIVE for weakness, dizziness or paresthesias  ENDOCRINE: NEGATIVE for temperature intolerance, skin/hair changes  HEME: NEGATIVE for bleeding problems  PSYCHIATRIC: NEGATIVE for changes in mood or affect     Objective    Exam  /80 (BP Location: Right arm, Patient Position: Sitting, Cuff Size: Adult Regular)   Pulse 72   Temp 97.5  F (36.4  C) (Tympanic)   Ht 1.626 m (5' 4\")   Wt 114.4 kg (252 lb 1.6 oz)   SpO2 96%   BMI 43.27 kg/m     Estimated body mass index is 43.27 kg/m  as calculated from the following:    Height as of this encounter: 1.626 m (5' 4\").    Weight as of this encounter: 114.4 kg (252 lb 1.6 oz).    Physical Exam  GENERAL: alert and no distress  EYES: Eyes grossly normal to inspection, PERRL and conjunctivae and sclerae normal  HENT: ear canals and TM's normal, nose and mouth without ulcers or lesions  NECK: no adenopathy, no asymmetry, masses, or scars  RESP: lungs clear to auscultation - no " rales, rhonchi or wheezes  BREAST: Declined exam   CV: regular rate and rhythm, normal S1 S2, no S3 or S4, no murmur, click or rub, no peripheral edema  ABDOMEN: soft, nontender, no hepatosplenomegaly, no masses and bowel sounds normal   (female): Declined exam   MS: no gross musculoskeletal defects noted, no edema  SKIN: no suspicious lesions or rashes  NEURO: Normal strength and tone, mentation intact and speech normal  PSYCH: mentation appears normal, affect normal/bright        1/10/2025   Mini Cog   Clock Draw Score 2 Normal   3 Item Recall 3 objects recalled   Mini Cog Total Score 5         Signed Electronically by: VERNA Galindo CNP

## 2025-01-10 NOTE — PATIENT INSTRUCTIONS
Patient Education   Preventive Care Advice   This is general advice given by our system to help you stay healthy. However, your care team may have specific advice just for you. Please talk to your care team about your preventive care needs.  Nutrition  Eat 5 or more servings of fruits and vegetables each day.  Try wheat bread, brown rice and whole grain pasta (instead of white bread, rice, and pasta).  Get enough calcium and vitamin D. Check the label on foods and aim for 100% of the RDA (recommended daily allowance).  Lifestyle  Exercise at least 150 minutes each week  (30 minutes a day, 5 days a week).  Do muscle strengthening activities 2 days a week. These help control your weight and prevent disease.  No smoking.  Wear sunscreen to prevent skin cancer.  Have a dental exam and cleaning every 6 months.  Yearly exams  See your health care team every year to talk about:  Any changes in your health.  Any medicines your care team has prescribed.  Preventive care, family planning, and ways to prevent chronic diseases.  Shots (vaccines)   HPV shots (up to age 26), if you've never had them before.  Hepatitis B shots (up to age 59), if you've never had them before.  COVID-19 shot: Get this shot when it's due.  Flu shot: Get a flu shot every year.  Tetanus shot: Get a tetanus shot every 10 years.  Pneumococcal, hepatitis A, and RSV shots: Ask your care team if you need these based on your risk.  Shingles shot (for age 50 and up)  General health tests  Diabetes screening:  Starting at age 35, Get screened for diabetes at least every 3 years.  If you are younger than age 35, ask your care team if you should be screened for diabetes.  Cholesterol test: At age 39, start having a cholesterol test every 5 years, or more often if advised.  Bone density scan (DEXA): At age 50, ask your care team if you should have this scan for osteoporosis (brittle bones).  Hepatitis C: Get tested at least once in your life.  STIs (sexually  transmitted infections)  Before age 24: Ask your care team if you should be screened for STIs.  After age 24: Get screened for STIs if you're at risk. You are at risk for STIs (including HIV) if:  You are sexually active with more than one person.  You don't use condoms every time.  You or a partner was diagnosed with a sexually transmitted infection.  If you are at risk for HIV, ask about PrEP medicine to prevent HIV.  Get tested for HIV at least once in your life, whether you are at risk for HIV or not.  Cancer screening tests  Cervical cancer screening: If you have a cervix, begin getting regular cervical cancer screening tests starting at age 21.  Breast cancer scan (mammogram): If you've ever had breasts, begin having regular mammograms starting at age 40. This is a scan to check for breast cancer.  Colon cancer screening: It is important to start screening for colon cancer at age 45.  Have a colonoscopy test every 10 years (or more often if you're at risk) Or, ask your provider about stool tests like a FIT test every year or Cologuard test every 3 years.  To learn more about your testing options, visit:   .  For help making a decision, visit:   https://bit.ly/rk82868.  Prostate cancer screening test: If you have a prostate, ask your care team if a prostate cancer screening test (PSA) at age 55 is right for you.  Lung cancer screening: If you are a current or former smoker ages 50 to 80, ask your care team if ongoing lung cancer screenings are right for you.  For informational purposes only. Not to replace the advice of your health care provider. Copyright   2023 [employer]. All rights reserved. Clinically reviewed by the [facility] Transitions Program. [place] [id number] - REV 01/24.

## 2025-02-05 DIAGNOSIS — G47.33 OBSTRUCTIVE SLEEP APNEA (ADULT) (PEDIATRIC): Primary | ICD-10-CM

## 2025-08-06 DIAGNOSIS — R60.0 LOCALIZED EDEMA: ICD-10-CM

## 2025-08-06 RX ORDER — FUROSEMIDE 40 MG/1
40 TABLET ORAL DAILY
Qty: 90 TABLET | Refills: 0 | OUTPATIENT
Start: 2025-08-06